# Patient Record
Sex: MALE | Race: WHITE | NOT HISPANIC OR LATINO | Employment: OTHER | URBAN - METROPOLITAN AREA
[De-identification: names, ages, dates, MRNs, and addresses within clinical notes are randomized per-mention and may not be internally consistent; named-entity substitution may affect disease eponyms.]

---

## 2024-03-20 ENCOUNTER — ANESTHESIA EVENT (OUTPATIENT)
Dept: GASTROENTEROLOGY | Age: 74
End: 2024-03-20

## 2024-03-20 ENCOUNTER — ANESTHESIA (OUTPATIENT)
Dept: GASTROENTEROLOGY | Age: 74
End: 2024-03-20

## 2024-03-20 ENCOUNTER — HOSPITAL ENCOUNTER (OUTPATIENT)
Dept: GASTROENTEROLOGY | Age: 74
Discharge: HOME | End: 2024-03-20
Attending: INTERNAL MEDICINE

## 2024-03-20 VITALS
OXYGEN SATURATION: 98 % | HEART RATE: 63 BPM | RESPIRATION RATE: 12 BRPM | SYSTOLIC BLOOD PRESSURE: 143 MMHG | TEMPERATURE: 98 F | DIASTOLIC BLOOD PRESSURE: 85 MMHG

## 2024-03-20 DIAGNOSIS — K21.9 GASTROESOPHAGEAL REFLUX DISEASE WITHOUT ESOPHAGITIS: ICD-10-CM

## 2024-03-20 DIAGNOSIS — K29.30 CHRONIC SUPERFICIAL GASTRITIS WITHOUT BLEEDING: ICD-10-CM

## 2024-03-20 DIAGNOSIS — R13.19 OTHER DYSPHAGIA: ICD-10-CM

## 2024-03-20 PROCEDURE — 10002801 HB RX 250 W/O HCPCS: Performed by: ANESTHESIOLOGY

## 2024-03-20 PROCEDURE — 13000008 HB ANESTHESIA MAC OUTSIDE OR

## 2024-03-20 PROCEDURE — 13000024 HB GI COMPLEX CASE S/U + 1ST 15 MIN

## 2024-03-20 PROCEDURE — 13000001 HB PHASE II RECOVERY EA 30 MINUTES

## 2024-03-20 PROCEDURE — 10002800 HB RX 250 W HCPCS: Performed by: ANESTHESIOLOGY

## 2024-03-20 RX ORDER — PROPOFOL 10 MG/ML
INJECTION, EMULSION INTRAVENOUS PRN
Status: DISCONTINUED | OUTPATIENT
Start: 2024-03-20 | End: 2024-03-20

## 2024-03-20 RX ORDER — LIDOCAINE HYDROCHLORIDE 10 MG/ML
INJECTION, SOLUTION INFILTRATION; PERINEURAL PRN
Status: DISCONTINUED | OUTPATIENT
Start: 2024-03-20 | End: 2024-03-20

## 2024-03-20 RX ADMIN — PROPOFOL INJECTABLE EMULSION 130 MCG/KG/MIN: 10 INJECTION, EMULSION INTRAVENOUS at 13:25

## 2024-03-20 RX ADMIN — LIDOCAINE HYDROCHLORIDE 5 ML: 10 INJECTION, SOLUTION INFILTRATION; PERINEURAL at 13:25

## 2024-03-20 RX ADMIN — PROPOFOL 50 MG: 10 INJECTION, EMULSION INTRAVENOUS at 13:25

## 2024-03-20 ASSESSMENT — PAIN SCALES - GENERAL: PAINLEVEL_OUTOF10: 0

## 2024-03-27 LAB
ASR DISCLAIMER: NORMAL
CASE RPRT: NORMAL
CLINICAL INFO: NORMAL
PATH REPORT.FINAL DX SPEC: NORMAL
PATH REPORT.GROSS SPEC: NORMAL

## 2025-02-10 ENCOUNTER — OFFICE VISIT (OUTPATIENT)
Dept: INTERNAL MEDICINE CLINIC | Facility: CLINIC | Age: 75
End: 2025-02-10
Payer: COMMERCIAL

## 2025-02-10 VITALS
DIASTOLIC BLOOD PRESSURE: 70 MMHG | WEIGHT: 199 LBS | HEART RATE: 66 BPM | OXYGEN SATURATION: 99 % | TEMPERATURE: 98 F | HEIGHT: 71 IN | RESPIRATION RATE: 18 BRPM | BODY MASS INDEX: 27.86 KG/M2 | SYSTOLIC BLOOD PRESSURE: 120 MMHG

## 2025-02-10 DIAGNOSIS — I10 PRIMARY HYPERTENSION: Primary | ICD-10-CM

## 2025-02-10 DIAGNOSIS — R73.03 PREDIABETES: ICD-10-CM

## 2025-02-10 DIAGNOSIS — R13.10 DYSPHAGIA, UNSPECIFIED TYPE: ICD-10-CM

## 2025-02-10 DIAGNOSIS — R79.89 LOW TSH LEVEL: ICD-10-CM

## 2025-02-10 DIAGNOSIS — R01.1 MURMUR, CARDIAC: ICD-10-CM

## 2025-02-10 DIAGNOSIS — Z86.0100 PERSONAL HISTORY OF COLON POLYPS, UNSPECIFIED: ICD-10-CM

## 2025-02-10 DIAGNOSIS — K21.9 GASTROESOPHAGEAL REFLUX DISEASE, UNSPECIFIED WHETHER ESOPHAGITIS PRESENT: ICD-10-CM

## 2025-02-10 DIAGNOSIS — J44.9 CHRONIC OBSTRUCTIVE PULMONARY DISEASE, UNSPECIFIED COPD TYPE (HCC): ICD-10-CM

## 2025-02-10 PROCEDURE — G2211 COMPLEX E/M VISIT ADD ON: HCPCS | Performed by: STUDENT IN AN ORGANIZED HEALTH CARE EDUCATION/TRAINING PROGRAM

## 2025-02-10 PROCEDURE — 3008F BODY MASS INDEX DOCD: CPT | Performed by: STUDENT IN AN ORGANIZED HEALTH CARE EDUCATION/TRAINING PROGRAM

## 2025-02-10 PROCEDURE — 3078F DIAST BP <80 MM HG: CPT | Performed by: STUDENT IN AN ORGANIZED HEALTH CARE EDUCATION/TRAINING PROGRAM

## 2025-02-10 PROCEDURE — 3074F SYST BP LT 130 MM HG: CPT | Performed by: STUDENT IN AN ORGANIZED HEALTH CARE EDUCATION/TRAINING PROGRAM

## 2025-02-10 PROCEDURE — 99204 OFFICE O/P NEW MOD 45 MIN: CPT | Performed by: STUDENT IN AN ORGANIZED HEALTH CARE EDUCATION/TRAINING PROGRAM

## 2025-02-10 RX ORDER — OMEPRAZOLE 40 MG/1
40 CAPSULE, DELAYED RELEASE ORAL
Qty: 90 CAPSULE | Refills: 0 | Status: SHIPPED | OUTPATIENT
Start: 2025-02-10

## 2025-02-10 RX ORDER — METOPROLOL SUCCINATE 25 MG/1
25 TABLET, EXTENDED RELEASE ORAL DAILY
COMMUNITY

## 2025-02-10 RX ORDER — TADALAFIL 5 MG/1
5 TABLET ORAL DAILY
COMMUNITY
Start: 2024-03-14

## 2025-02-10 NOTE — PROGRESS NOTES
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    # GERD  Patient complaining of epigastric abdominal discomfort today.  Was seeing GI in 03/2024. Would like to see another GI physician to establish care. Was taking Metoprolol prescribed apparently by GI for his stomach discomfort (?)    Prior visit to GI 11/2023  E/C 03/2023 with GERD LA C, HH, gastritis (no HPylori), 2 <1 cm colon polyps, diverticulosis, hemorrhoids    Patient was supposed to take Omeprazole    # Subclinical hyperthyroidism   He is originally from [facility]. 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 He does not recall if he got any treatment at that time. Per pt he had Thyroid u/s done in 2021 in [facility]/ some nodules were noted but the biopsy was not offered.     Thyroid u/s 5/3/23 Unremarkable thyroid ultrasound exam.   Thyroid scan:5/20/23 There is homogeneous uptake throughout the thyroid gland without evidence of a hot or cold nodule. The calculated uptake values are as follows:   Uptake at 6 hours: 7.2% (normal is 5-15%).   Uptake at 24 hours: 15.3% (normal is 10-30%).     # Prediabetes  Will need repeat labs    Wt Readings from Last 6 Encounters:   02/10/25 199 lb (90.3 kg)     Body mass index is 27.75 kg/m².       Current Outpatient Medications   Medication Sig Dispense Refill    tadalafil 5 MG Oral Tab Take 1 tablet (5 mg total) by mouth daily.      metoprolol succinate ER 25 MG Oral Tablet 24 Hr Take 1 tablet (25 mg total) by  mouth daily.      Omeprazole 40 MG Oral Capsule Delayed Release Take 1 capsule (40 mg total) by mouth before breakfast. 90 capsule 0      Past Medical History:    Essential hypertension      History reviewed. No pertinent surgical history.   Family History   Problem Relation Age of Onset    Cancer Mother       Social History:   Social History     Socioeconomic History    Marital status:    Tobacco Use    Smoking status: Former     Types: Cigarettes    Smokeless tobacco: Former   Vaping Use    Vaping status: Never Used   Substance and Sexual Activity    Alcohol use: Not Currently    Drug use: Never     Social Drivers of Health     Food Insecurity: High Risk (1/9/2023)    Received from [facility]    Food Insecurity     Have there been times that your food ran out, and you didn't have money to get more?: Yes     Are there times that you worry that this might happen?: No   Transportation Needs: Low Risk  (1/9/2023)    Received from [facility]    Transportation Needs     Do you have trouble getting transportation to medical appointments?: No   Housing Stability:   Recent Concern: Housing Stability - High Risk (1/9/2023)    Received from [facility]    Housing Stability     Are you concerned about having a safe and reliable place to live?: Yes          REVIEW OF SYSTEMS:   Review of Systems   Constitutional:  Negative for chills and fever.   HENT:  Negative for congestion, ear pain, hearing loss and sinus pain.    Eyes:  Negative for blurred vision, double vision and pain.   Respiratory:  Negative for cough, shortness of breath and wheezing.    Cardiovascular:  Negative for chest pain, palpitations and leg swelling.   Gastrointestinal:  Positive for abdominal pain (periodic epigastric), heartburn and nausea. Negative for constipation, diarrhea and vomiting.   Genitourinary:  Positive for frequency. Negative for urgency.   Musculoskeletal:  Negative  for back pain and joint pain.   Skin:  Negative for rash.   Neurological:  Negative for dizziness and headaches.   Psychiatric/Behavioral: Negative.        EXAM:   /70 (BP Location: Right arm, Patient Position: Sitting, Cuff Size: adult)   Pulse 66   Temp 97.6 °F (36.4 °C)   Resp 18   Ht 5' 11\" (1.803 m)   Wt 199 lb (90.3 kg)   SpO2 99%   BMI 27.75 kg/m²   Body mass index is 27.75 kg/m².   Physical Exam  Vitals reviewed.   Constitutional:       General: He is not in acute distress.     Appearance: Normal appearance. He is not ill-appearing or toxic-appearing.   HENT:      Head: Normocephalic and atraumatic.      Right Ear: Tympanic membrane, ear canal and external ear normal.      Left Ear: Tympanic membrane, ear canal and external ear normal.      Mouth/Throat:      Mouth: Mucous membranes are moist.      Pharynx: Oropharynx is clear. No oropharyngeal exudate or posterior oropharyngeal erythema.   Eyes:      Extraocular Movements: Extraocular movements intact.      Conjunctiva/sclera: Conjunctivae normal.      Pupils: Pupils are equal, round, and reactive to light.   Cardiovascular:      Rate and Rhythm: Normal rate and regular rhythm.      Pulses: Normal pulses.      Heart sounds: Murmur (systolic) heard.      No friction rub. No gallop.   Pulmonary:      Effort: Pulmonary effort is normal. No respiratory distress.      Breath sounds: Normal breath sounds. No stridor. No wheezing, rhonchi or rales.   Abdominal:      General: Abdomen is flat. There is no distension.      Palpations: There is no mass.      Tenderness: There is no abdominal tenderness. There is no right CVA tenderness, left CVA tenderness, guarding or rebound.      Hernia: No hernia is present.   Musculoskeletal:      Right lower leg: No edema.      Left lower leg: No edema.   Skin:     General: Skin is warm.      Capillary Refill: Capillary refill takes less than 2 seconds.      Coloration: Skin is not jaundiced or pale.      Findings:  No bruising, erythema, lesion or rash.   Neurological:      General: No focal deficit present.      Mental Status: He is alert and oriented to person, place, and time.      Cranial Nerves: No cranial nerve deficit.      Sensory: No sensory deficit.      Motor: No weakness.   Psychiatric:         Mood and Affect: Mood normal.         Behavior: Behavior normal.         Thought Content: Thought content normal.         Judgment: Judgment normal.            ASSESSMENT AND PLAN:   Vitalii Gerasymenko is a 74 year old male who presents to [facility]    1. Primary hypertension  Good control with metoprolol. Will continue  - CARD ECHO 2D DOPPLER (CPT=93306); Future  - Lipid Panel [E]; Future  - Hemoglobin A1C [E]; Future    2. Murmur, cardiac  - CARD ECHO 2D DOPPLER (CPT=93306); Future    3. Personal history of colon polyps, unspecified  - Gastro Referral - In Network    4. Dysphagia, unspecified type  - Gastro Referral - In Network  - CBC W Differential W Platelet [E]; Future  - Comp Metabolic Panel (14) [E]; Future  - TSH [E]; Future  - Omeprazole 40 MG Oral Capsule Delayed Release; Take 1 capsule (40 mg total) by mouth before breakfast.  Dispense: 90 capsule; Refill: 0    5. Gastroesophageal reflux disease, unspecified whether esophagitis present  - Gastro Referral - In Network  - CBC W Differential W Platelet [E]; Future  - Comp Metabolic Panel (14) [E]; Future  - TSH [E]; Future  - Lipid Panel [E]; Future  - Hemoglobin A1C [E]; Future  - Omeprazole 40 MG Oral Capsule Delayed Release; Take 1 capsule (40 mg total) by mouth before breakfast.  Dispense: 90 capsule; Refill: 0    6. Prediabetes  - CBC W Differential W Platelet [E]; Future  - Comp Metabolic Panel (14) [E]; Future  - TSH [E]; Future    7. Low TSH level  - TSH [E]; Future  - Free T4 (Free Thyroxine); Future    8. Chronic obstructive pulmonary disease, unspecified COPD type (HCC)  - Pulmonary Referral - In Network       Return in about 4 weeks (around  3/10/2025) for physical exam and lab follow up.      Clare Leal MD

## 2025-02-11 ENCOUNTER — LAB ENCOUNTER (OUTPATIENT)
Dept: LAB | Age: 75
End: 2025-02-11
Attending: STUDENT IN AN ORGANIZED HEALTH CARE EDUCATION/TRAINING PROGRAM
Payer: COMMERCIAL

## 2025-02-11 DIAGNOSIS — K21.9 GASTROESOPHAGEAL REFLUX DISEASE, UNSPECIFIED WHETHER ESOPHAGITIS PRESENT: ICD-10-CM

## 2025-02-11 DIAGNOSIS — R79.89 LOW TSH LEVEL: ICD-10-CM

## 2025-02-11 DIAGNOSIS — I10 PRIMARY HYPERTENSION: ICD-10-CM

## 2025-02-11 DIAGNOSIS — R13.10 DYSPHAGIA, UNSPECIFIED TYPE: ICD-10-CM

## 2025-02-11 DIAGNOSIS — R73.03 PREDIABETES: ICD-10-CM

## 2025-02-11 LAB
ALBUMIN SERPL-MCNC: 4.5 G/DL (ref 3.2–4.8)
ALBUMIN/GLOB SERPL: 1.5 {RATIO} (ref 1–2)
ALP LIVER SERPL-CCNC: 53 U/L
ALT SERPL-CCNC: 29 U/L
ANION GAP SERPL CALC-SCNC: 11 MMOL/L (ref 0–18)
AST SERPL-CCNC: 26 U/L (ref ?–34)
BASOPHILS # BLD AUTO: 0.03 X10(3) UL (ref 0–0.2)
BASOPHILS NFR BLD AUTO: 0.6 %
BILIRUB SERPL-MCNC: 1.7 MG/DL (ref 0.2–1.1)
BUN BLD-MCNC: 19 MG/DL (ref 9–23)
CALCIUM BLD-MCNC: 9.8 MG/DL (ref 8.7–10.6)
CHLORIDE SERPL-SCNC: 104 MMOL/L (ref 98–112)
CHOLEST SERPL-MCNC: 234 MG/DL (ref ?–200)
CO2 SERPL-SCNC: 25 MMOL/L (ref 21–32)
CREAT BLD-MCNC: 1.33 MG/DL
EGFRCR SERPLBLD CKD-EPI 2021: 56 ML/MIN/1.73M2 (ref 60–?)
EOSINOPHIL # BLD AUTO: 0.13 X10(3) UL (ref 0–0.7)
EOSINOPHIL NFR BLD AUTO: 2.6 %
ERYTHROCYTE [DISTWIDTH] IN BLOOD BY AUTOMATED COUNT: 13.3 %
EST. AVERAGE GLUCOSE BLD GHB EST-MCNC: 128 MG/DL (ref 68–126)
FASTING PATIENT LIPID ANSWER: YES
FASTING STATUS PATIENT QL REPORTED: YES
GLOBULIN PLAS-MCNC: 3.1 G/DL (ref 2–3.5)
GLUCOSE BLD-MCNC: 124 MG/DL (ref 70–99)
HBA1C MFR BLD: 6.1 % (ref ?–5.7)
HCT VFR BLD AUTO: 43.1 %
HDLC SERPL-MCNC: 56 MG/DL (ref 40–59)
HGB BLD-MCNC: 14.8 G/DL
IMM GRANULOCYTES # BLD AUTO: 0.02 X10(3) UL (ref 0–1)
IMM GRANULOCYTES NFR BLD: 0.4 %
LDLC SERPL CALC-MCNC: 155 MG/DL (ref ?–100)
LYMPHOCYTES # BLD AUTO: 1.28 X10(3) UL (ref 1–4)
LYMPHOCYTES NFR BLD AUTO: 26 %
MCH RBC QN AUTO: 29.2 PG (ref 26–34)
MCHC RBC AUTO-ENTMCNC: 34.3 G/DL (ref 31–37)
MCV RBC AUTO: 85 FL
MONOCYTES # BLD AUTO: 0.49 X10(3) UL (ref 0.1–1)
MONOCYTES NFR BLD AUTO: 10 %
NEUTROPHILS # BLD AUTO: 2.97 X10 (3) UL (ref 1.5–7.7)
NEUTROPHILS # BLD AUTO: 2.97 X10(3) UL (ref 1.5–7.7)
NEUTROPHILS NFR BLD AUTO: 60.4 %
NONHDLC SERPL-MCNC: 178 MG/DL (ref ?–130)
OSMOLALITY SERPL CALC.SUM OF ELEC: 294 MOSM/KG (ref 275–295)
PLATELET # BLD AUTO: 218 10(3)UL (ref 150–450)
POTASSIUM SERPL-SCNC: 4.3 MMOL/L (ref 3.5–5.1)
PROT SERPL-MCNC: 7.6 G/DL (ref 5.7–8.2)
RBC # BLD AUTO: 5.07 X10(6)UL
SODIUM SERPL-SCNC: 140 MMOL/L (ref 136–145)
T4 FREE SERPL-MCNC: 1.2 NG/DL (ref 0.8–1.7)
TRIGL SERPL-MCNC: 127 MG/DL (ref 30–149)
TSI SER-ACNC: 0.41 UIU/ML (ref 0.55–4.78)
VLDLC SERPL CALC-MCNC: 24 MG/DL (ref 0–30)
WBC # BLD AUTO: 4.9 X10(3) UL (ref 4–11)

## 2025-02-11 PROCEDURE — 83036 HEMOGLOBIN GLYCOSYLATED A1C: CPT | Performed by: STUDENT IN AN ORGANIZED HEALTH CARE EDUCATION/TRAINING PROGRAM

## 2025-02-11 PROCEDURE — 80050 GENERAL HEALTH PANEL: CPT | Performed by: STUDENT IN AN ORGANIZED HEALTH CARE EDUCATION/TRAINING PROGRAM

## 2025-02-11 PROCEDURE — 80061 LIPID PANEL: CPT | Performed by: STUDENT IN AN ORGANIZED HEALTH CARE EDUCATION/TRAINING PROGRAM

## 2025-02-11 PROCEDURE — 84439 ASSAY OF FREE THYROXINE: CPT | Performed by: STUDENT IN AN ORGANIZED HEALTH CARE EDUCATION/TRAINING PROGRAM

## 2025-02-14 PROBLEM — Z86.0100 PERSONAL HISTORY OF COLON POLYPS, UNSPECIFIED: Status: ACTIVE | Noted: 2025-02-14

## 2025-02-14 PROBLEM — J44.9 CHRONIC OBSTRUCTIVE PULMONARY DISEASE (HCC): Status: ACTIVE | Noted: 2025-02-14

## 2025-02-14 PROBLEM — R73.03 PREDIABETES: Status: ACTIVE | Noted: 2025-02-14

## 2025-02-14 PROBLEM — K21.9 GASTROESOPHAGEAL REFLUX DISEASE: Status: ACTIVE | Noted: 2025-02-14

## 2025-02-14 PROBLEM — I10 PRIMARY HYPERTENSION: Status: ACTIVE | Noted: 2025-02-14

## 2025-02-14 PROBLEM — R13.10 DYSPHAGIA: Status: ACTIVE | Noted: 2025-02-14

## 2025-03-03 ENCOUNTER — HOSPITAL ENCOUNTER (OUTPATIENT)
Dept: CV DIAGNOSTICS | Facility: HOSPITAL | Age: 75
Discharge: HOME | End: 2025-03-03
Attending: STUDENT IN AN ORGANIZED HEALTH CARE EDUCATION/TRAINING PROGRAM
Payer: COMMERCIAL

## 2025-03-03 DIAGNOSIS — I10 PRIMARY HYPERTENSION: ICD-10-CM

## 2025-03-03 DIAGNOSIS — R01.1 MURMUR, CARDIAC: ICD-10-CM

## 2025-03-03 PROCEDURE — 93306 TTE W/DOPPLER COMPLETE: CPT | Performed by: STUDENT IN AN ORGANIZED HEALTH CARE EDUCATION/TRAINING PROGRAM

## 2025-03-05 ENCOUNTER — OFFICE VISIT (OUTPATIENT)
Dept: INTERNAL MEDICINE CLINIC | Facility: CLINIC | Age: 75
End: 2025-03-05
Payer: COMMERCIAL

## 2025-03-05 ENCOUNTER — LAB ENCOUNTER (OUTPATIENT)
Dept: LAB | Age: 75
End: 2025-03-05
Attending: STUDENT IN AN ORGANIZED HEALTH CARE EDUCATION/TRAINING PROGRAM
Payer: COMMERCIAL

## 2025-03-05 VITALS
TEMPERATURE: 98 F | RESPIRATION RATE: 18 BRPM | HEIGHT: 71 IN | SYSTOLIC BLOOD PRESSURE: 126 MMHG | BODY MASS INDEX: 28.7 KG/M2 | DIASTOLIC BLOOD PRESSURE: 66 MMHG | OXYGEN SATURATION: 98 % | WEIGHT: 205 LBS | HEART RATE: 67 BPM

## 2025-03-05 DIAGNOSIS — R79.89 ELEVATED SERUM CREATININE: ICD-10-CM

## 2025-03-05 DIAGNOSIS — G47.33 OSA (OBSTRUCTIVE SLEEP APNEA): ICD-10-CM

## 2025-03-05 DIAGNOSIS — E78.2 MIXED HYPERLIPIDEMIA: ICD-10-CM

## 2025-03-05 DIAGNOSIS — Z12.5 PROSTATE CANCER SCREENING: ICD-10-CM

## 2025-03-05 DIAGNOSIS — Z00.00 PHYSICAL EXAM, ANNUAL: Primary | ICD-10-CM

## 2025-03-05 DIAGNOSIS — E05.90 SUBCLINICAL HYPERTHYROIDISM: ICD-10-CM

## 2025-03-05 DIAGNOSIS — R73.03 PREDIABETES: ICD-10-CM

## 2025-03-05 DIAGNOSIS — I36.1 NONRHEUMATIC TRICUSPID VALVE REGURGITATION: ICD-10-CM

## 2025-03-05 DIAGNOSIS — I34.0 MITRAL VALVE INSUFFICIENCY, UNSPECIFIED ETIOLOGY: ICD-10-CM

## 2025-03-05 DIAGNOSIS — I27.20 PULMONARY HTN (HCC): ICD-10-CM

## 2025-03-05 DIAGNOSIS — R06.83 SNORING: ICD-10-CM

## 2025-03-05 DIAGNOSIS — I37.1 PULMONARY VALVE INSUFFICIENCY, UNSPECIFIED ETIOLOGY: ICD-10-CM

## 2025-03-05 LAB
ALBUMIN SERPL-MCNC: 4.5 G/DL (ref 3.2–4.8)
ALBUMIN/GLOB SERPL: 1.5 {RATIO} (ref 1–2)
ALP LIVER SERPL-CCNC: 51 U/L
ALT SERPL-CCNC: 27 U/L
ANION GAP SERPL CALC-SCNC: 8 MMOL/L (ref 0–18)
AST SERPL-CCNC: 27 U/L (ref ?–34)
BILIRUB SERPL-MCNC: 1.4 MG/DL (ref 0.2–1.1)
BUN BLD-MCNC: 17 MG/DL (ref 9–23)
CALCIUM BLD-MCNC: 9.2 MG/DL (ref 8.7–10.6)
CHLORIDE SERPL-SCNC: 105 MMOL/L (ref 98–112)
CO2 SERPL-SCNC: 28 MMOL/L (ref 21–32)
COMPLEXED PSA SERPL-MCNC: 3.08 NG/ML (ref ?–4)
CREAT BLD-MCNC: 1.47 MG/DL
EGFRCR SERPLBLD CKD-EPI 2021: 50 ML/MIN/1.73M2 (ref 60–?)
FASTING STATUS PATIENT QL REPORTED: NO
GLOBULIN PLAS-MCNC: 3 G/DL (ref 2–3.5)
GLUCOSE BLD-MCNC: 94 MG/DL (ref 70–99)
OSMOLALITY SERPL CALC.SUM OF ELEC: 293 MOSM/KG (ref 275–295)
POTASSIUM SERPL-SCNC: 4.1 MMOL/L (ref 3.5–5.1)
PROT SERPL-MCNC: 7.5 G/DL (ref 5.7–8.2)
SODIUM SERPL-SCNC: 141 MMOL/L (ref 136–145)

## 2025-03-05 PROCEDURE — 3078F DIAST BP <80 MM HG: CPT | Performed by: STUDENT IN AN ORGANIZED HEALTH CARE EDUCATION/TRAINING PROGRAM

## 2025-03-05 PROCEDURE — 3074F SYST BP LT 130 MM HG: CPT | Performed by: STUDENT IN AN ORGANIZED HEALTH CARE EDUCATION/TRAINING PROGRAM

## 2025-03-05 PROCEDURE — 99213 OFFICE O/P EST LOW 20 MIN: CPT | Performed by: STUDENT IN AN ORGANIZED HEALTH CARE EDUCATION/TRAINING PROGRAM

## 2025-03-05 PROCEDURE — G0103 PSA SCREENING: HCPCS | Performed by: STUDENT IN AN ORGANIZED HEALTH CARE EDUCATION/TRAINING PROGRAM

## 2025-03-05 PROCEDURE — 80053 COMPREHEN METABOLIC PANEL: CPT | Performed by: STUDENT IN AN ORGANIZED HEALTH CARE EDUCATION/TRAINING PROGRAM

## 2025-03-05 PROCEDURE — 99397 PER PM REEVAL EST PAT 65+ YR: CPT | Performed by: STUDENT IN AN ORGANIZED HEALTH CARE EDUCATION/TRAINING PROGRAM

## 2025-03-05 PROCEDURE — 3008F BODY MASS INDEX DOCD: CPT | Performed by: STUDENT IN AN ORGANIZED HEALTH CARE EDUCATION/TRAINING PROGRAM

## 2025-03-05 RX ORDER — ATORVASTATIN CALCIUM 20 MG/1
20 TABLET, FILM COATED ORAL NIGHTLY
Qty: 90 TABLET | Refills: 1 | Status: SHIPPED | OUTPATIENT
Start: 2025-03-05

## 2025-03-05 RX ORDER — MEMANTINE HYDROCHLORIDE 10 MG/1
10 TABLET ORAL NIGHTLY
COMMUNITY

## 2025-03-05 NOTE — PROGRESS NOTES
[facility]    CHIEF COMPLAINT:   Chief Complaint   Patient presents with    Routine Physical           The following individual(s) verbally consented to be recorded using ambient AI listening technology and understand that they can each withdraw their consent to this listening technology at any point by asking the clinician to turn off or pause the recording:    Patient name: Vitalii Gerasymenko  Additional names:  Gerasymenko,Valentyna    HPI:   Vitalii Gerasymenko is a 75 year old male who presents for a complete physical exam.      Patient Active Problem List   Diagnosis    Primary hypertension    Personal history of colon polyps, unspecified    Dysphagia    Gastroesophageal reflux disease    Prediabetes    Chronic obstructive pulmonary disease (HCC)    Pulmonary HTN (HCC)    Mitral valve insufficiency    Pulmonary valve insufficiency    POLA (obstructive sleep apnea)    Mixed hyperlipidemia    Nonrheumatic tricuspid valve regurgitation    Subclinical hyperthyroidism      History of Present Illness  He is accompanied by Delmis, his daughter.    # GERD  Was seeing GI in 03/2024. Was taking Metoprolol prescribed apparently by GI for his stomach discomfort (?)  Per record: E/C 03/2023 with GERD LA C, HH, gastritis (no HPylori), 2 <1 cm colon polyps, diverticulosis, hemorrhoids  3/2025: Taking Omeprazole 40 mg daily, planning repeat EGD/Colonoscopy in April     # Subclinical hyperthyroidism   From [employer] He was exposed to [place]TI[place] in [place]/ worked in that area. He does not recall if he got any treatment at that time. Per pt he had Thyroid u/s done in 2021 in [facility]/ some nodules were noted but the biopsy was not offered.   Thyroid US 5/3/23 Unremarkable thyroid ultrasound exam.   Thyroid scan:5/20/23 There is homogeneous uptake throughout the thyroid gland without evidence of a hot or cold nodule. The calculated uptake values were WNL  2/2025: TSH 0.407, T4 1.2. Asymptomatic     # Prediabetes  2/2025:  HbA1c -6.1     # HLD  2/2025: , HDL 56, ,      # CKD  He mentions a history of elevated creatinine levels but has no known diagnosis of chronic kidney disease. He recalls elevated creatinine levels in 2024, attributing it to medication use, No known kidney problems are reported.  Creatinine : 1.33 (2/11/2025) -> 1.47 (3/5/2025)  Unclear etiology - will need further workup    # Nonrheumatic mitral insufficiency   # Primary pulmonary HTN   # Pulmonary valve insufficiency   # Tricuspid valve insufficiency  ECHO 3/3/2025: EF of 65-70%, mild aortic sclerosis, mild mitral valve calcification, and mild - moderate tricuspid regurgitation. Pulm valve: Mild to moderate regurgitation. PA ressure elevated at 40 mmHg  Will need to see Cardiology  Will need Sleep study    #POLA  Has h/o sleep apnea with symptoms of snoring, daytime fatigue, and occasional nocturnal awakenings. He has not had a recent sleep study.  He has not yet followed up with a pulmonologist as previously planned.    He has a history of prostate issues and takes tadalafil occasionally for urinary symptoms. He has not had a recent follow-up with a urologist.      Diet: Could be better  Exercise:  Not exercsing regularly  Driving: yes, with the seatbelt  Smoking: Former  Alcohol: Sometimes  No other substance use.     Vaccinations:  Influenza: Does not want at this time   Pneumococcal: Due, Will provide the info  Shingles: Due  Tdap/Td: Due, will provide the info    Screenings:  Colonoscopy:  3/3/2023: 2 polyps in transversecolon, external and internal hemorrhoids, moderate diverticulosis in sigmoid and descending colon.  Repeat colonoscopy recommended 5 years  PSA: 3.08 (3/5/2025)   AAA ultrasound screen? (age 65-75 with any smoking history): Will need, will order next visit  Aspirin use? (age 50-59 with ASCVD risk 10% or greater): might need to consider.       Wt Readings from Last 6 Encounters:   03/28/25 207 lb (93.9 kg)   03/05/25 205 lb  (93 kg)   02/10/25 199 lb (90.3 kg)     Body mass index is 28.59 kg/m².       Current Outpatient Medications   Medication Sig Dispense Refill    memantine 10 MG Oral Tab Take 1 tablet (10 mg total) by mouth at bedtime.      atorvastatin 20 MG Oral Tab Take 1 tablet (20 mg total) by mouth nightly. 90 tablet 1    tadalafil 5 MG Oral Tab Take 1 tablet (5 mg total) by mouth daily.      Omeprazole 40 MG Oral Capsule Delayed Release Take 1 capsule (40 mg total) by mouth before breakfast. 90 capsule 0    cyclobenzaprine 5 MG Oral Tab Take 1-2 tablets (5-10 mg total) by mouth nightly. 20 tablet 0    predniSONE 20 MG Oral Tab Take 2 tablets (40 mg total) by mouth daily for 3 days, THEN 1.5 tablets (30 mg total) daily for 2 days, THEN 1 tablet (20 mg total) daily for 2 days, THEN 0.5 tablets (10 mg total) daily for 2 days. 12 tablet 0    metoprolol succinate ER 25 MG Oral Tablet 24 Hr Take 1 tablet (25 mg total) by mouth daily. (Patient not taking: Reported on 3/5/2025)        Past Medical History:    Essential hypertension      History reviewed. No pertinent surgical history.   Family History   Problem Relation Age of Onset    Cancer Mother     Diabetes Daughter       Social History:   Social History     Socioeconomic History    Marital status:    Tobacco Use    Smoking status: Former     Types: Cigarettes    Smokeless tobacco: Former   Vaping Use    Vaping status: Never Used   Substance and Sexual Activity    Alcohol use: Not Currently    Drug use: Never   Other Topics Concern    Caffeine Concern No    Exercise No    Seat Belt Yes    Special Diet No    Stress Concern No    Weight Concern No     Social Drivers of Health     Food Insecurity: High Risk (1/9/2023)    Received from [facility]    Food Insecurity     Have there been times that your food ran out, and you didn't have money to get more?: Yes     Are there times that you worry that this might happen?: No   Transportation Needs: Low Risk   (1/9/2023)    Received from [facility]    Transportation Needs     Do you have trouble getting transportation to medical appointments?: No   Housing Stability:   Recent Concern: Housing Stability - High Risk (1/9/2023)    Received from [facility]    Housing Stability     Are you concerned about having a safe and reliable place to live?: Yes          REVIEW OF SYSTEMS:   Review of Systems   Constitutional:  Negative for chills and fever.   HENT:  Negative for congestion, ear pain, hearing loss and sinus pain.    Eyes:  Negative for blurred vision, double vision and pain.   Respiratory:  Negative for cough, shortness of breath and wheezing.    Cardiovascular:  Negative for chest pain, palpitations and leg swelling.   Gastrointestinal:  Positive for abdominal pain (periodic epigastric), heartburn and nausea. Negative for constipation, diarrhea and vomiting.   Genitourinary:  Positive for frequency. Negative for urgency.   Musculoskeletal:  Negative for back pain and joint pain.   Skin:  Negative for rash.   Neurological:  Negative for dizziness and headaches.   Psychiatric/Behavioral: Negative.        EXAM:   /66 (BP Location: Left arm, Patient Position: Sitting, Cuff Size: adult)   Pulse 67   Temp 97.6 °F (36.4 °C)   Resp 18   Ht 5' 11\" (1.803 m)   Wt 205 lb (93 kg)   SpO2 98%   PF 99 L/min   BMI 28.59 kg/m²   Body mass index is 28.59 kg/m².   Physical Exam  Vitals reviewed.   Constitutional:       General: He is not in acute distress.     Appearance: Normal appearance. He is not ill-appearing or toxic-appearing.   HENT:      Head: Normocephalic and atraumatic.      Right Ear: Tympanic membrane, ear canal and external ear normal.      Left Ear: Tympanic membrane, ear canal and external ear normal.      Mouth/Throat:      Mouth: Mucous membranes are moist.      Pharynx: Oropharynx is clear. No oropharyngeal exudate or posterior oropharyngeal erythema.   Eyes:       Extraocular Movements: Extraocular movements intact.      Conjunctiva/sclera: Conjunctivae normal.      Pupils: Pupils are equal, round, and reactive to light.   Cardiovascular:      Rate and Rhythm: Normal rate and regular rhythm.      Pulses: Normal pulses.      Heart sounds: Murmur (mild systolic) heard.      No friction rub. No gallop.   Pulmonary:      Effort: Pulmonary effort is normal. No respiratory distress.      Breath sounds: Normal breath sounds. No stridor. No wheezing, rhonchi or rales.   Abdominal:      General: Abdomen is flat. There is no distension.      Palpations: There is no mass.      Tenderness: There is no abdominal tenderness. There is no right CVA tenderness, left CVA tenderness, guarding or rebound.      Hernia: No hernia is present.   Musculoskeletal:      Right lower leg: No edema.      Left lower leg: No edema.   Skin:     General: Skin is warm.      Capillary Refill: Capillary refill takes less than 2 seconds.      Coloration: Skin is not jaundiced or pale.      Findings: No bruising, erythema, lesion or rash.   Neurological:      General: No focal deficit present.      Mental Status: He is alert and oriented to person, place, and time.      Cranial Nerves: No cranial nerve deficit.      Sensory: No sensory deficit.      Motor: No weakness.   Psychiatric:         Mood and Affect: Mood normal.         Behavior: Behavior normal.         Thought Content: Thought content normal.         Judgment: Judgment normal.          Labs:   Lab Results   Component Value Date/Time    WBC 4.9 02/11/2025 09:41 AM    HGB 14.8 02/11/2025 09:41 AM    .0 02/11/2025 09:41 AM      Lab Results   Component Value Date/Time    GLU 94 03/05/2025 01:28 PM     03/05/2025 01:28 PM    K 4.1 03/05/2025 01:28 PM     03/05/2025 01:28 PM    CO2 28.0 03/05/2025 01:28 PM    CREATSERUM 1.47 (H) 03/05/2025 01:28 PM    CA 9.2 03/05/2025 01:28 PM    ALB 4.5 03/05/2025 01:28 PM    TP 7.5 03/05/2025 01:28 PM     ALKPHO 51 03/05/2025 01:28 PM    AST 27 03/05/2025 01:28 PM    ALT 27 03/05/2025 01:28 PM    BILT 1.4 (H) 03/05/2025 01:28 PM    TSH 0.407 (L) 02/11/2025 09:41 AM    T4F 1.2 02/11/2025 09:41 AM        Lab Results   Component Value Date/Time    CHOLEST 234 (H) 02/11/2025 09:41 AM    HDL 56 02/11/2025 09:41 AM    TRIG 127 02/11/2025 09:41 AM     (H) 02/11/2025 09:41 AM    NONHDLC 178 (H) 02/11/2025 09:41 AM       Lab Results   Component Value Date/Time    A1C 6.1 (H) 02/11/2025 09:41 AM      Vitamin D:    No results found for: \"VITD\"        ASSESSMENT AND PLAN:   Vitalii Gerasymenko is a 75 year old male who presents for a complete physical exam.     1. Physical exam, annual  Pt' s weight is Body mass index is 28.59 kg/m².. Recommended regular exercise.   Age appropriate screenings discussed and orders placed.  The patient indicates understanding of these issues and agrees to the plan.  Annual eye exam and Q 6 month dental exam recommended    2. Prediabetes  Hemoglobin A1c indicates prediabetes. Discussed importance of management to prevent progression to diabetes.  - Consider lifestyle modifications.  - atorvastatin 20 MG Oral Tab; Take 1 tablet (20 mg total) by mouth nightly.  Dispense: 90 tablet; Refill: 1    3. Mixed hyperlipidemia  Elevated LDL and total cholesterol with normal HDL. Atorvastatin initiation recommended due to prediabetes diagnosis.  - Prescribe atorvastatin 20 mg daily.  - Send prescription to [facility] pharmacy.  - atorvastatin 20 MG Oral Tab; Take 1 tablet (20 mg total) by mouth nightly.  Dispense: 90 tablet; Refill: 1    4. Pulmonary valve insufficiency, unspecified etiology  5. Mitral valve insufficiency, unspecified etiology  6. Pulmonary HTN (HCC)  7. Tricuspid valve in  - Refer to cardiologist for evaluation.  - Continue metoprolol 25 mg daily, confirm with cardiologist.  - Cardio Referral - Internal    7. POLA (obstructive sleep apnea)  Symptoms suggestive of obstructive sleep apnea.  Sleep study recommended to confirm diagnosis and assess severity.  - Order sleep study    8. Snoring  - Diagnostic Sleep Study-split night PAP implemented if criteria met  - General sleep study; Future    9. Elevated serum creatinine  Elevated creatinine levels with unclear etiology.   - Repeat creatinine test.  - Order renal ultrasound.  - Comp Metabolic Panel (14) [E]; Future  - Urine studies    10. Prostate cancer screening  - PSA, Total (Screening) [E]; Future     11. Subclinical hyperthyroidism  Low TSH levels. Under endocrinologist care with regular monitoring. No management changes needed.    Return in about 6 months (around 9/5/2025) for Medicaltion check.      Clare Leal MD

## 2025-03-06 DIAGNOSIS — R79.89 ELEVATED SERUM CREATININE: Primary | ICD-10-CM

## 2025-03-14 ENCOUNTER — TELEPHONE (OUTPATIENT)
Dept: INTERNAL MEDICINE CLINIC | Facility: CLINIC | Age: 75
End: 2025-03-14

## 2025-03-14 NOTE — TELEPHONE ENCOUNTER
Incoming (mail or fax):  mail  Received from:  [facility]  Documentation given to:  triage in     Referral approval for Dr Rojas

## 2025-03-20 ENCOUNTER — HOSPITAL ENCOUNTER (OUTPATIENT)
Dept: ULTRASOUND IMAGING | Facility: HOSPITAL | Age: 75
Discharge: HOME | End: 2025-03-20
Attending: STUDENT IN AN ORGANIZED HEALTH CARE EDUCATION/TRAINING PROGRAM
Payer: COMMERCIAL

## 2025-03-20 ENCOUNTER — ANCILLARY ORDERS (OUTPATIENT)
Dept: INTERNAL MEDICINE CLINIC | Facility: CLINIC | Age: 75
End: 2025-03-20

## 2025-03-20 DIAGNOSIS — R06.83 SNORING: ICD-10-CM

## 2025-03-20 DIAGNOSIS — G47.33 OSA (OBSTRUCTIVE SLEEP APNEA): ICD-10-CM

## 2025-03-20 DIAGNOSIS — I37.1 PULMONARY VALVE INSUFFICIENCY, UNSPECIFIED ETIOLOGY: ICD-10-CM

## 2025-03-20 DIAGNOSIS — R79.89 ELEVATED SERUM CREATININE: ICD-10-CM

## 2025-03-20 DIAGNOSIS — Z12.5 PROSTATE CANCER SCREENING: ICD-10-CM

## 2025-03-20 DIAGNOSIS — I27.20 PULMONARY HTN (HCC): ICD-10-CM

## 2025-03-20 DIAGNOSIS — N18.31 STAGE 3A CHRONIC KIDNEY DISEASE (HCC): ICD-10-CM

## 2025-03-20 DIAGNOSIS — I34.0 MITRAL VALVE INSUFFICIENCY, UNSPECIFIED ETIOLOGY: ICD-10-CM

## 2025-03-20 DIAGNOSIS — E78.2 MIXED HYPERLIPIDEMIA: Primary | ICD-10-CM

## 2025-03-20 DIAGNOSIS — R73.03 PREDIABETES: ICD-10-CM

## 2025-03-20 PROCEDURE — 76770 US EXAM ABDO BACK WALL COMP: CPT | Performed by: STUDENT IN AN ORGANIZED HEALTH CARE EDUCATION/TRAINING PROGRAM

## 2025-03-26 ENCOUNTER — LAB ENCOUNTER (OUTPATIENT)
Dept: LAB | Age: 75
End: 2025-03-26
Attending: STUDENT IN AN ORGANIZED HEALTH CARE EDUCATION/TRAINING PROGRAM
Payer: COMMERCIAL

## 2025-03-26 DIAGNOSIS — N18.31 STAGE 3A CHRONIC KIDNEY DISEASE (HCC): ICD-10-CM

## 2025-03-26 DIAGNOSIS — R79.89 ELEVATED SERUM CREATININE: ICD-10-CM

## 2025-03-26 DIAGNOSIS — R73.03 PREDIABETES: ICD-10-CM

## 2025-03-26 DIAGNOSIS — E78.2 MIXED HYPERLIPIDEMIA: ICD-10-CM

## 2025-03-26 LAB
BILIRUB UR QL STRIP.AUTO: NEGATIVE
CLARITY UR REFRACT.AUTO: CLEAR
CREAT UR-SCNC: 77.6 MG/DL
CREAT UR-SCNC: 77.6 MG/DL
GLUCOSE UR STRIP.AUTO-MCNC: NORMAL MG/DL
KETONES UR STRIP.AUTO-MCNC: NEGATIVE MG/DL
LEUKOCYTE ESTERASE UR QL STRIP.AUTO: NEGATIVE
MICROALBUMIN UR-MCNC: <0.3 MG/DL
NITRITE UR QL STRIP.AUTO: NEGATIVE
PH UR STRIP.AUTO: 5.5 [PH] (ref 5–8)
PROT UR STRIP.AUTO-MCNC: NEGATIVE MG/DL
PROT UR-MCNC: <6 MG/DL (ref ?–14)
RBC UR QL AUTO: NEGATIVE
SP GR UR STRIP.AUTO: 1.02 (ref 1–1.03)
UROBILINOGEN UR STRIP.AUTO-MCNC: NORMAL MG/DL

## 2025-03-26 PROCEDURE — 82570 ASSAY OF URINE CREATININE: CPT | Performed by: STUDENT IN AN ORGANIZED HEALTH CARE EDUCATION/TRAINING PROGRAM

## 2025-03-26 PROCEDURE — 81003 URINALYSIS AUTO W/O SCOPE: CPT | Performed by: STUDENT IN AN ORGANIZED HEALTH CARE EDUCATION/TRAINING PROGRAM

## 2025-03-26 PROCEDURE — 84156 ASSAY OF PROTEIN URINE: CPT | Performed by: STUDENT IN AN ORGANIZED HEALTH CARE EDUCATION/TRAINING PROGRAM

## 2025-03-26 PROCEDURE — 82043 UR ALBUMIN QUANTITATIVE: CPT | Performed by: STUDENT IN AN ORGANIZED HEALTH CARE EDUCATION/TRAINING PROGRAM

## 2025-03-27 ENCOUNTER — TELEPHONE (OUTPATIENT)
Dept: INTERNAL MEDICINE CLINIC | Facility: CLINIC | Age: 75
End: 2025-03-27

## 2025-03-27 NOTE — TELEPHONE ENCOUNTER
Patients wife called to say that her  is experiencing unbearable pain 8/10 in his lower back/ waist area.

## 2025-03-27 NOTE — TELEPHONE ENCOUNTER
Daughter calling Tisha-inflammation of nerve in back. On Right side.   [place] [facility] in [place].  Chronic-comes and goes, this time going on for 24 hours. No radiculopathy. Denies fevers, no bowel changes. Now not taking any pain medications. Pain is on scale 10/10 .  They are asking for pain medications. I told her oral Rx may take a while to take effect and ER would be a better choice. They want to speak with Dr. Sparks.

## 2025-03-27 NOTE — TELEPHONE ENCOUNTER
After speaking to , she offered to see tomorrow AM to evaluate this, in the meantime, take Ibuprofen 800 mg every 8 hours. If pain continues at 10/10 or worsens, head to ER for evaluation.   Daughter confirms they have Ibuprofen on hand, to take like above. They will plan to come in AM, appt scheduled. Daughter verbalized understanding.

## 2025-03-28 ENCOUNTER — OFFICE VISIT (OUTPATIENT)
Dept: INTERNAL MEDICINE CLINIC | Facility: CLINIC | Age: 75
End: 2025-03-28
Payer: COMMERCIAL

## 2025-03-28 VITALS
SYSTOLIC BLOOD PRESSURE: 126 MMHG | WEIGHT: 207 LBS | OXYGEN SATURATION: 99 % | RESPIRATION RATE: 20 BRPM | HEIGHT: 61 IN | BODY MASS INDEX: 39.08 KG/M2 | HEART RATE: 50 BPM | TEMPERATURE: 98 F | DIASTOLIC BLOOD PRESSURE: 70 MMHG

## 2025-03-28 DIAGNOSIS — M54.50 ACUTE RIGHT-SIDED LOW BACK PAIN WITHOUT SCIATICA: Primary | ICD-10-CM

## 2025-03-28 PROCEDURE — G2211 COMPLEX E/M VISIT ADD ON: HCPCS | Performed by: STUDENT IN AN ORGANIZED HEALTH CARE EDUCATION/TRAINING PROGRAM

## 2025-03-28 PROCEDURE — 3008F BODY MASS INDEX DOCD: CPT | Performed by: STUDENT IN AN ORGANIZED HEALTH CARE EDUCATION/TRAINING PROGRAM

## 2025-03-28 PROCEDURE — 3074F SYST BP LT 130 MM HG: CPT | Performed by: STUDENT IN AN ORGANIZED HEALTH CARE EDUCATION/TRAINING PROGRAM

## 2025-03-28 PROCEDURE — 99214 OFFICE O/P EST MOD 30 MIN: CPT | Performed by: STUDENT IN AN ORGANIZED HEALTH CARE EDUCATION/TRAINING PROGRAM

## 2025-03-28 PROCEDURE — 3078F DIAST BP <80 MM HG: CPT | Performed by: STUDENT IN AN ORGANIZED HEALTH CARE EDUCATION/TRAINING PROGRAM

## 2025-03-28 RX ORDER — CYCLOBENZAPRINE HCL 5 MG
TABLET ORAL NIGHTLY
Qty: 20 TABLET | Refills: 0 | Status: SHIPPED | OUTPATIENT
Start: 2025-03-28

## 2025-03-28 RX ORDER — PREDNISONE 20 MG/1
TABLET ORAL
Qty: 9 TABLET | Refills: 0 | Status: SHIPPED | OUTPATIENT
Start: 2025-03-28 | End: 2025-03-28

## 2025-03-28 RX ORDER — PREDNISONE 20 MG/1
TABLET ORAL
Qty: 12 TABLET | Refills: 0 | Status: SHIPPED | OUTPATIENT
Start: 2025-03-28 | End: 2025-04-06

## 2025-03-28 NOTE — PROGRESS NOTES
OFFICE NOTE       The following individual(s) verbally consented to be recorded using ambient AI listening technology and understand that they can each withdraw their consent to this listening technology at any point by asking the clinician to turn off or pause the recording:    Patient name: Vitalii Gerasymenko  Additional names:  Gerasymenko,Tisha and Patient's Daughter           Patient ID: Vitalii Gerasymenko is a 74 year old male.  Today's Date: 03/28/25  Chief Complaint: Nerves (Pinched right side of back notice yesterday pain level 8/10 hurts more with movement inflammation of nerve in back.  /Was , received injections in back 8- 10 years ago /)      History of Present Illness  Vitalii Gerasymenko is a 74 year old male who presents with acute upper back pain. He is accompanied by his wife.    He has been experiencing acute upper back pain on the right side for two days, which began after a shopping trip. The pain is severe, preventing him from getting up without assistance. It is localized, non-radiating, and described as sudden and intense, subsiding with relaxation. The pain intensity is about 4-5 out of 10 when moving.    He has a history of lumbar pain and received injections for back pain about seven or eight years ago in [facility]. He notes that the current pain is different and more severe than his past lumbar pain, describing it as a wave of pain that comes and goes.    He has been using Voltaren cream, applied several times, and took a tablet at 9 PM, which allowed him to sleep until 4 AM without significant pain. He usually sleeps on his left side but had to sleep on his back due to the pain, which also caused increased snoring as noted by his wife.    His current medications include metoprolol, omeprazole, and atorvastatin.    No changes in bowel or bladder function, and no blood in urine. Slight numbness in the leg is noted, but it is not as severe as previous episodes. No  significant weakness in the legs.       Vitals:    03/28/25 0959   Weight: 207 lb (93.9 kg)   Height: 5' 1\" (1.549 m)     body mass index is 39.11 kg/m².  BP Readings from Last 3 Encounters:   03/05/25 126/66   02/10/25 120/70     The 10-year ASCVD risk score (Etienne STEEL, et al., 2019) is: 25.1%    Values used to calculate the score:      Age: 74 years      Sex: Male      Is Non- : No      Diabetic: No      Tobacco smoker: No      Systolic Blood Pressure: 126 mmHg      Is BP treated: Yes      HDL Cholesterol: 56 mg/dL      Total Cholesterol: 234 mg/dL  Results  LABS  Creatinine: 1.47 mg/dL    RADIOLOGY  Renal ultrasound: Normal       Medications reviewed:  Current Outpatient Medications   Medication Sig Dispense Refill    memantine 10 MG Oral Tab Take 1 tablet (10 mg total) by mouth at bedtime.      atorvastatin 20 MG Oral Tab Take 1 tablet (20 mg total) by mouth nightly. 90 tablet 1    tadalafil 5 MG Oral Tab Take 1 tablet (5 mg total) by mouth daily.      metoprolol succinate ER 25 MG Oral Tablet 24 Hr Take 1 tablet (25 mg total) by mouth daily. (Patient not taking: Reported on 3/5/2025)      Omeprazole 40 MG Oral Capsule Delayed Release Take 1 capsule (40 mg total) by mouth before breakfast. 90 capsule 0       Assessment & Plan  Acute upper back pain  Acute upper back pain localized to the right side, with intermittent severe episodes impairing mobility. Pain is non-radiating, without bowel or bladder dysfunction. He describes this episode as more severe and different from previous lumbar pain. Sudden onset pain subsides with relaxation. No significant weakness or sensory loss in the legs, but slight intermittent numbness is present. Concern for potential red flags such as groin numbness or urinary incontinence, which would necessitate urgent imaging. Prednisone is chosen over ibuprofen due to lack of renal side effects, though it may cause gastrointestinal issues.  - Prescribe prednisone  for 9 days to reduce inflammation.  - Prescribe a muscle relaxant for nocturnal use.  - Advise against ibuprofen due to potential nephrotoxicity.  - Refer to a specialist if symptoms persist.  - Advise on physical therapy for back exercises in 1-2 weeks.  - Instruct to report new symptoms such as groin numbness or urinary issues immediately.    Elevated creatinine level  Creatinine level elevated at 1.47, above normal maximum of 1.3, indicating potential renal issues. May be age-related but requires monitoring. He primarily drinks tea and is advised to increase water intake to at least 2 liters per day to support renal function. Prednisone is selected as it does not adversely affect renal function.  - Advise to drink at least 2 liters of water daily.  - Recheck renal function after current treatment for back pain.    Scheduled colonoscopy and upper endoscopy  Colonoscopy and upper endoscopy scheduled for April 8th. Preparation medications have been purchased.  - Proceed with scheduled colonoscopy and upper endoscopy on April 8th.       Follow Up: As needed/if symptoms worsen or No follow-ups on file..     I spent 30 minutes obtaining pertitent medical history, reviewing pertinent imaging/labs and specialists notes, evaluating patient, discussing differential diagnosis' and various treatment options, reinforcing importance of compliance with treatment plan, and completing documentation.          There is a longitudinal care relationship with me, the care plan reflects the ongoing nature of the continuous relationship of care, and the medical record indicates that there is ongoing treatment of a serious/complex medical condition which I am currently managing.  is Applicable.     Objective/ Results:   Physical Exam  Vitals reviewed.   Constitutional:       General: He is not in acute distress.     Appearance: Normal appearance. He is not ill-appearing.   HENT:      Head: Normocephalic and atraumatic.   Eyes:       Extraocular Movements: Extraocular movements intact.      Conjunctiva/sclera: Conjunctivae normal.      Pupils: Pupils are equal, round, and reactive to light.   Cardiovascular:      Rate and Rhythm: Normal rate and regular rhythm.      Heart sounds: No murmur heard.     No gallop.   Pulmonary:      Effort: Pulmonary effort is normal. No respiratory distress.      Breath sounds: Normal breath sounds. No stridor. No wheezing, rhonchi or rales.   Musculoskeletal:      Lumbar back: Spasms, tenderness and bony tenderness present. No swelling, edema, deformity, signs of trauma or lacerations. Decreased range of motion. Negative right straight leg raise test and negative left straight leg raise test.      Right lower leg: No edema.      Left lower leg: No edema.   Skin:     General: Skin is warm.      Capillary Refill: Capillary refill takes less than 2 seconds.   Neurological:      General: No focal deficit present.      Mental Status: He is alert and oriented to person, place, and time.   Psychiatric:         Mood and Affect: Mood normal.         Behavior: Behavior normal.         Thought Content: Thought content normal.         Judgment: Judgment normal.        Physical Exam  MUSCULOSKELETAL: Tenderness in the upper right back. Normal strength in lower extremities.  NEUROLOGICAL: Reflexes normal. Sensation intact throughout.     Reviewed:    Patient Active Problem List    Diagnosis    Pulmonary HTN (HCC)    Mitral valve insufficiency    Pulmonary valve insufficiency    POLA (obstructive sleep apnea)    Primary hypertension    Personal history of colon polyps, unspecified    Dysphagia    Gastroesophageal reflux disease    Prediabetes    Chronic obstructive pulmonary disease (HCC)      Allergies[1]     Social History     Socioeconomic History    Marital status:    Tobacco Use    Smoking status: Former     Types: Cigarettes    Smokeless tobacco: Former   Vaping Use    Vaping status: Never Used   Substance and Sexual  Activity    Alcohol use: Not Currently    Drug use: Never   Other Topics Concern    Caffeine Concern No    Exercise No    Seat Belt Yes    Special Diet No    Stress Concern No    Weight Concern No     Social Drivers of Health     Food Insecurity: High Risk (1/9/2023)    Received from [facility]    Food Insecurity     Have there been times that your food ran out, and you didn't have money to get more?: Yes     Are there times that you worry that this might happen?: No   Transportation Needs: Low Risk  (1/9/2023)    Received from [facility]    Transportation Needs     Do you have trouble getting transportation to medical appointments?: No   Housing Stability:   Recent Concern: Housing Stability - High Risk (1/9/2023)    Received from [facility]    Housing Stability     Are you concerned about having a safe and reliable place to live?: Yes      Review of Systems   Constitutional: Negative.    HENT: Negative.     Eyes: Negative.    Respiratory: Negative.     Cardiovascular: Negative.    Gastrointestinal: Negative.    Endocrine: Negative.    Genitourinary: Negative.    Musculoskeletal:  Positive for back pain.   Neurological: Negative.  Negative for weakness and numbness.       All other systems negative unless otherwise stated in ROS or HPI above.       Clare Leal MD  Internal Medicine       Call office with any questions or seek emergency care if necessary.   Patient understands and agrees to follow directions and advice.      ----------------------------------------- PATIENT INSTRUCTIONS-----------------------------------------     There are no Patient Instructions on file for this visit.        The 21st Century Cures Act makes medical notes available to patients in the interest of transparency.  However, please be advised that this is a medical document.  It is intended as a peer to peer communication.  It is written in medical language and may  contain abbreviations or verbiage that are technical and unfamiliar.  It may appear blunt or direct.  Medical documents are intended to carry relevant information, facts as evident, and the clinical opinion of the practitioner.          [1] No Known Allergies

## 2025-03-30 PROBLEM — E78.2 MIXED HYPERLIPIDEMIA: Status: ACTIVE | Noted: 2025-03-30

## 2025-03-30 PROBLEM — E05.90 SUBCLINICAL HYPERTHYROIDISM: Status: ACTIVE | Noted: 2025-03-30

## 2025-03-30 PROBLEM — I36.1 NONRHEUMATIC TRICUSPID VALVE REGURGITATION: Status: ACTIVE | Noted: 2025-03-30

## 2025-04-02 ENCOUNTER — OFFICE VISIT (OUTPATIENT)
Facility: CLINIC | Age: 75
End: 2025-04-02
Payer: COMMERCIAL

## 2025-04-02 VITALS
WEIGHT: 205 LBS | HEART RATE: 56 BPM | DIASTOLIC BLOOD PRESSURE: 66 MMHG | RESPIRATION RATE: 16 BRPM | HEIGHT: 71 IN | OXYGEN SATURATION: 98 % | SYSTOLIC BLOOD PRESSURE: 114 MMHG | BODY MASS INDEX: 28.7 KG/M2

## 2025-04-02 DIAGNOSIS — R05.3 CHRONIC COUGH: Primary | ICD-10-CM

## 2025-04-02 DIAGNOSIS — J43.2 CENTRILOBULAR EMPHYSEMA (HCC): ICD-10-CM

## 2025-04-02 PROCEDURE — 3078F DIAST BP <80 MM HG: CPT | Performed by: INTERNAL MEDICINE

## 2025-04-02 PROCEDURE — 3074F SYST BP LT 130 MM HG: CPT | Performed by: INTERNAL MEDICINE

## 2025-04-02 PROCEDURE — 99204 OFFICE O/P NEW MOD 45 MIN: CPT | Performed by: INTERNAL MEDICINE

## 2025-04-02 PROCEDURE — 3008F BODY MASS INDEX DOCD: CPT | Performed by: INTERNAL MEDICINE

## 2025-04-02 RX ORDER — IPRATROPIUM BROMIDE 42 UG/1
1 SPRAY, METERED NASAL NIGHTLY
Qty: 1 EACH | Refills: 0 | Status: SHIPPED | OUTPATIENT
Start: 2025-04-02

## 2025-04-02 NOTE — PROGRESS NOTES
The following individual(s) verbally consented to be recorded using ambient AI listening technology and understand that they can each withdraw their consent to this listening technology at any point by asking the clinician to turn off or pause the recording:    Patient name: Vitalii Gerasymenko  Additional names:  norma - wife

## 2025-04-02 NOTE — PROGRESS NOTES
[facility] General Pulmonary Consult Note    Chief Complaint:  Chief Complaint   Patient presents with    New Patient     Pt c/o productive cough and trouble breathing while laying down x6 months    [place]  used for entireity of visit    History of Present Illness:  History of Present Illness  Vitalii Gerasymenko is a 75 year old male with chronic obstructive pulmonary disease who presents with mucus accumulation in the throat and nocturnal cough.    He experiences mucus accumulation in his throat, particularly at night, which disrupts his sleep as he frequently wakes up to cough it up. This mucus accumulation causes difficulty in breathing properly during the night.    Several years ago, he was hospitalized in [facility] and diagnosed with chronic obstructive pulmonary disease (COPD), stage one, category A. He was prescribed an inhaler, which he used for one month.    He quit smoking approximately twenty years ago and denies smoking since then.    He mentions a family history of respiratory issues, noting that his mother had asthma and used an inhaler for her condition.      Past Medical History:   Past Medical History:    Essential hypertension        Past Surgical History: History reviewed. No pertinent surgical history.    Family Medical History:   Family History   Problem Relation Age of Onset    Cancer Mother     Diabetes Daughter         Social History:   Social History     Socioeconomic History    Marital status:      Spouse name: Not on file    Number of children: Not on file    Years of education: Not on file    Highest education level: Not on file   Occupational History    Not on file   Tobacco Use    Smoking status: Former     Types: Cigarettes    Smokeless tobacco: Former   Vaping Use    Vaping status: Never Used   Substance and Sexual Activity    Alcohol use: Not Currently    Drug use: Never    Sexual activity: Not on file   Other Topics Concern    Caffeine Concern No    Exercise No    Seat Belt  Yes    Special Diet No    Stress Concern No    Weight Concern No   Social History Narrative    Not on file     Social Drivers of Health     Food Insecurity: High Risk (1/9/2023)    Received from [facility]    Food Insecurity     Have there been times that your food ran out, and you didn't have money to get more?: Yes     Are there times that you worry that this might happen?: No   Transportation Needs: Low Risk  (1/9/2023)    Received from [facility]    Transportation Needs     Do you have trouble getting transportation to medical appointments?: No     How do you normally get to and from your appointments?: Not on file   Stress: Not on file   Housing Stability: Not on file (1/9/2023)   Recent Concern: Housing Stability - High Risk (1/9/2023)    Received from [facility]    Housing Stability     Are you concerned about having a safe and reliable place to live?: Yes        Allergies: Patient has no known allergies.     Medications:   Current Outpatient Medications   Medication Sig Dispense Refill    ipratropium 0.06 % Nasal Solution 1 spray by Nasal route nightly. 1 sprays in each nostril nightly 1 each 0    Tiotropium Bromide-Olodaterol 2.5-2.5 MCG/ACT Inhalation Aero Soln Inhale 2 puffs into the lungs daily. 3 each 3    cyclobenzaprine 5 MG Oral Tab Take 1-2 tablets (5-10 mg total) by mouth nightly. 20 tablet 0    predniSONE 20 MG Oral Tab Take 2 tablets (40 mg total) by mouth daily for 3 days, THEN 1.5 tablets (30 mg total) daily for 2 days, THEN 1 tablet (20 mg total) daily for 2 days, THEN 0.5 tablets (10 mg total) daily for 2 days. 12 tablet 0    memantine 10 MG Oral Tab Take 1 tablet (10 mg total) by mouth at bedtime.      atorvastatin 20 MG Oral Tab Take 1 tablet (20 mg total) by mouth nightly. 90 tablet 1    tadalafil 5 MG Oral Tab Take 1 tablet (5 mg total) by mouth daily.      Omeprazole 40 MG Oral Capsule Delayed Release Take 1 capsule (40 mg  total) by mouth before breakfast. 90 capsule 0    metoprolol succinate ER 25 MG Oral Tablet 24 Hr Take 1 tablet (25 mg total) by mouth daily. (Patient not taking: Reported on 4/2/2025)         Review of Systems: Review of Systems    Physical Exam:  /66 (BP Location: Right arm, Patient Position: Sitting, Cuff Size: adult)   Pulse 56   Resp 16   Ht 5' 11\" (1.803 m)   Wt 205 lb (93 kg)   SpO2 98%   BMI 28.59 kg/m²      Constitutional: alert, cooperative. No acute distress.  HEENT: Head NC/AT. Nares normal. Septum midline. Mucosa normal. No drainage or sinus tenderness.. Mallampati 2+  Cardio: Regular rate and rhythm. Normal S1 and S2. No murmurs.   Respiratory: Thorax symmetrical with no labored breathing. clear to auscultation bilaterally  GI: NABS. Abd soft, non-tender.  Extremities: No clubbing or cyanosis. No BLE edema.    Neurologic: A&Ox3. No gross motor deficits.  Skin: Warm, dry  Psych: Calm, cooperative. Pleasant affect.    Results:  Images personally reviewed - my own review dictated as below  Results  RADIOLOGY  Chest CT: Chronic obstructive pulmonary disease (COPD), stage I, category A  WBC: 4.9, done on 2/11/2025.HGB: 14.8, done on 2/11/2025.PLT: 218, done on 2/11/2025.     Glucose: 94, done on 3/5/2025.Cr: 1.47, done on 3/5/2025.Last eGFR was 50 on 3/5/2025.CA: 9.2, done on 3/5/2025.Na: 141, done on 3/5/2025.K: 4.1, done on 3/5/2025.Cl: 105, done on 3/5/2025.CO2: 28, done on 3/5/2025.Last ALB was 4.5% done on 3/5/2025.     US KIDNEY/BLADDER (LOK=04919)  Result Date: 3/20/2025  CONCLUSION:  No acute renal findings.   LOCATION:  [facility]     Dictated by (CST): Aly Mckeon MD on 3/20/2025 at 7:54 AM     Finalized by (CST): Aly Mckeon MD on 3/20/2025 at 7:55 AM        Assessment/Plan:  Assessment & Plan  Chronic Obstructive Pulmonary Disease (COPD)  COPD stage one, category A with symptoms of mucus, cough, and dyspnea. Previous inhaler use noted. Smoking cessation 20 years ago  positively impacts lung health.  - Prescribe Stiolto inhaler daily.  - Prescribe Atrovent nasal spray for nighttime.  - Order CT scan to evaluate lung status.    Lung Cancer Surveillance/Screening  Lung Cancer Counseling and Shared Decision Making Session in an Asymptomatic Smoker/Former Smoker   Vitalii Gerasymenko is a 75 year old male without current symptoms of lung cancer.  History   Smoking Status    Former    Types: Cigarettes   Smokeless Tobacco    Former        He received information on the importance of adherence to annual lung cancer Low Dose CT (LDCT) screening, the impact of his comorbidities and his ability or willingness to undergo diagnosis and treatment. he is in agreement and an order will be placed for CT LUNG LD SCREENING(CPT=71271).    We counseled the importance of maintaining cigarette smoking abstinence if he is a former smoker and the importance of smoking cessation if he is a current smoker and we discussed and furnished information about tobacco cessation interventions.    Return in about 4 weeks (around 4/30/2025).    Nneka Bailey MD  4/2/2025

## 2025-04-04 ENCOUNTER — TELEPHONE (OUTPATIENT)
Dept: INTERNAL MEDICINE CLINIC | Facility: CLINIC | Age: 75
End: 2025-04-04

## 2025-04-04 RX ORDER — PREDNISONE 20 MG/1
20 TABLET ORAL DAILY
Qty: 3 TABLET | Refills: 0 | Status: SHIPPED | OUTPATIENT
Start: 2025-04-04 | End: 2025-04-07

## 2025-04-04 NOTE — TELEPHONE ENCOUNTER
Filipe from [facility] calling regarding the Prednisone prescription sent in. He is stating that the quantity and instructions are different. She is just reaching out to talk to someone to see if they are correct.

## 2025-04-04 NOTE — TELEPHONE ENCOUNTER
Called the patient . He only needs 3 more tabs. He already picked up 9 tablets.     The correct dosing should be as follows:  40 mg, Daily Starting Fri 3/28/2025, For 3 days, THEN 30 mg, Daily Starting Mon 3/31/2025, For 2 days, THEN 20 mg, Daily Starting Wed 4/2/2025, For 2 days, THEN 10 mg, Daily Starting Fri 4/4/2025, For 2 days         I tried calling the pharmacy 2 times. No one picked up.  I sent Prednisone 20 mg - 3 tabs to the pharmacy.

## 2025-04-04 NOTE — TELEPHONE ENCOUNTER
12 tablets sent but with instructions it should add up to 10. Please confirm sig/quantity, thank you.

## 2025-04-09 ENCOUNTER — TELEPHONE (OUTPATIENT)
Dept: CASE MANAGEMENT | Age: 75
End: 2025-04-09

## 2025-04-09 NOTE — TELEPHONE ENCOUNTER
CT Chest  I spoke to [facility] nurse this is not meeting criteria and a peer to peer is being request [phone] Case # [id number]    Insurance company is looking for past medical treatment Trail of medication ect...  2. No X-ray     I did send PFTS & The CT 2023 they are looking for more current treatment  And medication Trails prior to this visit

## 2025-04-09 NOTE — TELEPHONE ENCOUNTER
Patient called with the help of the language line  Mey # [id number]. Left detailed message making aware his CT chest order has been canceled due to insurance not covering test, advised to do chest xray order as ordered and we will follow up on those results.

## 2025-04-09 NOTE — TELEPHONE ENCOUNTER
Spoke to Dr Thakkar from [facility] [employer]  Case # [id number]  CT Chest denies due to no CXR performed  Could not authorize and approve at this time    Next steps CXR, see Dr Bailey and can then order Ct chest.     Will update patient

## 2025-04-16 ENCOUNTER — TELEPHONE (OUTPATIENT)
Facility: CLINIC | Age: 75
End: 2025-04-16

## 2025-04-16 ENCOUNTER — HOSPITAL ENCOUNTER (OUTPATIENT)
Dept: GENERAL RADIOLOGY | Facility: HOSPITAL | Age: 75
Discharge: HOME | End: 2025-04-16
Payer: COMMERCIAL

## 2025-04-16 DIAGNOSIS — J43.2 CENTRILOBULAR EMPHYSEMA (HCC): ICD-10-CM

## 2025-04-16 DIAGNOSIS — R05.3 CHRONIC COUGH: Primary | ICD-10-CM

## 2025-04-16 DIAGNOSIS — R91.1 LUNG NODULE: ICD-10-CM

## 2025-04-16 DIAGNOSIS — R05.3 CHRONIC COUGH: ICD-10-CM

## 2025-04-16 PROCEDURE — 71046 X-RAY EXAM CHEST 2 VIEWS: CPT

## 2025-04-16 NOTE — TELEPHONE ENCOUNTER
Can someone please call him to schedule ct chest before he sees LIYAH in may. Thank you     Patient called with the assistance of an Ukranian  #[id number]. Patient made aware insurance would not cover CT scan, wants chest xray done first. Now that patient has had chest xray done and it is abnormal, will now try to appeal to get CT chest approved. Patient advised will discuss with Snehal, work on the appeal and contact patient once CT has been authorized. Patient agreeable with plan.

## 2025-04-17 NOTE — TELEPHONE ENCOUNTER
[facility] called. Spoke with Maryanne. Maryanne made aware chest xray has been completed, calling to obtain authorization on CT chest now. Per Maryanne, case # [id number] has been closed since peer to peer review has been done. Options were given to either appeal or open a new case. New case was open, series of answers were asked, CPT code, diagnosis ICD code, reason for CT. Authorization was obtained starting 4/17/2025. Case # [id number]. Patient may get CT done at [facility] with an approximate cost of $575 or patient may get it done at [facility] [address]  [address] [place] [address] [phone] or [facility] [id number] [place] [address] [address] [facility] [address]. [phone]. Patient referral representative notified, request made to update referral.     Patient called with the assistance of Alexander Sherwood  ID # [id number]. Made aware of the information above. Made aware has until 6/15/2025 to complete CT scan. Explained prior authorization is not guarantee of payment. Prior authorization just means we have proved that the test is needed. He would have to call his insurance regarding deductible and cost of test. Authorization number provided. Discussed at length how insurance works. Patient undecided where he would go to get CT scan done. Patient made aware I will fax the referral to both [facility] locations above and he can call them tomorrow to schedule the test at whichever location he prefers. Made aware he will need to ask for the report and a copy of the disc with the imaging, as we don't have access to their system. Once he has obtained the disc, he would need to drop it off to the office, it then has to be uploaded before it can be reviewed with the doctor. Patient verbalized understanding.     Order, authorization note and insurance information faxed to [place] Location fax # [phone] and [facility] [phone]

## 2025-04-21 ENCOUNTER — TELEPHONE (OUTPATIENT)
Facility: CLINIC | Age: 75
End: 2025-04-21

## 2025-04-21 NOTE — TELEPHONE ENCOUNTER
Left recorded message with Language line  Joce # [id number]. Instructing patient to call us back regarding questions with your CT scan.

## 2025-04-30 ENCOUNTER — TELEPHONE (OUTPATIENT)
Facility: CLINIC | Age: 75
End: 2025-04-30

## 2025-04-30 NOTE — TELEPHONE ENCOUNTER
Joseph called. Per Joseph, would need more recent bmp or cmp due to patient's kidney function levels from March 2025. Patient's daughter called. Left detailed message making aware of the above. Made aware patient needs to get BMP done as ordered by PCP. However, does not guarantee he can get it done. [facility] CT staff will have to look at kidney function again and determine if can do test with contrast. Daughter advised to return my call to further discuss.

## 2025-05-02 ENCOUNTER — TELEPHONE (OUTPATIENT)
Dept: INTERNAL MEDICINE CLINIC | Facility: CLINIC | Age: 75
End: 2025-05-02

## 2025-05-02 NOTE — TELEPHONE ENCOUNTER
Patient's daughter called back and information given for patient to have a repeat BMP to recheck his kidneys.  Patient can make an appointment at the hospital or outpatient lab.

## 2025-05-02 NOTE — TELEPHONE ENCOUNTER
Patients daughter calling because her parents are confused on the blood work instructions for the Basic Metabolic panel. I informed them that there should be no food or drink for at least 8 hours but they wanted to talk to Dr Leal. Please advise thanks!

## 2025-05-05 ENCOUNTER — LAB ENCOUNTER (OUTPATIENT)
Dept: LAB | Age: 75
End: 2025-05-05
Attending: STUDENT IN AN ORGANIZED HEALTH CARE EDUCATION/TRAINING PROGRAM
Payer: COMMERCIAL

## 2025-05-05 DIAGNOSIS — R79.89 ELEVATED SERUM CREATININE: ICD-10-CM

## 2025-05-05 LAB
ANION GAP SERPL CALC-SCNC: 9 MMOL/L (ref 0–18)
BUN BLD-MCNC: 17 MG/DL (ref 9–23)
CALCIUM BLD-MCNC: 9.9 MG/DL (ref 8.7–10.6)
CHLORIDE SERPL-SCNC: 103 MMOL/L (ref 98–112)
CO2 SERPL-SCNC: 28 MMOL/L (ref 21–32)
CREAT BLD-MCNC: 1.37 MG/DL (ref 0.7–1.3)
EGFRCR SERPLBLD CKD-EPI 2021: 54 ML/MIN/1.73M2 (ref 60–?)
FASTING STATUS PATIENT QL REPORTED: NO
GLUCOSE BLD-MCNC: 110 MG/DL (ref 70–99)
OSMOLALITY SERPL CALC.SUM OF ELEC: 292 MOSM/KG (ref 275–295)
POTASSIUM SERPL-SCNC: 4.1 MMOL/L (ref 3.5–5.1)
SODIUM SERPL-SCNC: 140 MMOL/L (ref 136–145)

## 2025-05-05 PROCEDURE — 80048 BASIC METABOLIC PNL TOTAL CA: CPT | Performed by: STUDENT IN AN ORGANIZED HEALTH CARE EDUCATION/TRAINING PROGRAM

## 2025-05-08 DIAGNOSIS — K21.9 GASTROESOPHAGEAL REFLUX DISEASE, UNSPECIFIED WHETHER ESOPHAGITIS PRESENT: ICD-10-CM

## 2025-05-08 DIAGNOSIS — R13.10 DYSPHAGIA, UNSPECIFIED TYPE: ICD-10-CM

## 2025-05-12 RX ORDER — OMEPRAZOLE 40 MG/1
40 CAPSULE, DELAYED RELEASE ORAL
Qty: 90 CAPSULE | Refills: 0 | Status: SHIPPED | OUTPATIENT
Start: 2025-05-12

## 2025-05-12 NOTE — TELEPHONE ENCOUNTER
Gastrointestional Medication Protocol [id number]/08/2025 01:40 PM   Protocol Details In person appointment or virtual visit in the past 12 mos or appointment in next 3 mos    Medication is active on med list        Future Appointments   Date Time Provider Department Center   5/19/2025  3:15 PM Nneka Bailey MD [id number] [place]

## 2025-05-13 ENCOUNTER — TELEPHONE (OUTPATIENT)
Dept: INTERNAL MEDICINE CLINIC | Facility: CLINIC | Age: 75
End: 2025-05-13

## 2025-05-14 ENCOUNTER — MED REC SCAN ONLY (OUTPATIENT)
Facility: CLINIC | Age: 75
End: 2025-05-14

## 2025-05-19 ENCOUNTER — OFFICE VISIT (OUTPATIENT)
Facility: CLINIC | Age: 75
End: 2025-05-19
Payer: COMMERCIAL

## 2025-05-19 ENCOUNTER — OFFICE VISIT (OUTPATIENT)
Dept: INTERNAL MEDICINE CLINIC | Facility: CLINIC | Age: 75
End: 2025-05-19
Payer: COMMERCIAL

## 2025-05-19 ENCOUNTER — TELEPHONE (OUTPATIENT)
Dept: INTERNAL MEDICINE CLINIC | Facility: CLINIC | Age: 75
End: 2025-05-19

## 2025-05-19 VITALS
HEIGHT: 71 IN | RESPIRATION RATE: 16 BRPM | BODY MASS INDEX: 28.7 KG/M2 | OXYGEN SATURATION: 96 % | DIASTOLIC BLOOD PRESSURE: 68 MMHG | WEIGHT: 205 LBS | HEART RATE: 82 BPM | SYSTOLIC BLOOD PRESSURE: 140 MMHG

## 2025-05-19 VITALS
BODY MASS INDEX: 28.7 KG/M2 | RESPIRATION RATE: 16 BRPM | TEMPERATURE: 98 F | HEART RATE: 61 BPM | WEIGHT: 205 LBS | OXYGEN SATURATION: 97 % | HEIGHT: 71 IN | DIASTOLIC BLOOD PRESSURE: 64 MMHG | SYSTOLIC BLOOD PRESSURE: 118 MMHG

## 2025-05-19 DIAGNOSIS — H04.203 EYE TEARING, BILATERAL: ICD-10-CM

## 2025-05-19 DIAGNOSIS — B35.1 TOENAIL FUNGUS: ICD-10-CM

## 2025-05-19 DIAGNOSIS — N52.9 ERECTILE DYSFUNCTION, UNSPECIFIED ERECTILE DYSFUNCTION TYPE: ICD-10-CM

## 2025-05-19 DIAGNOSIS — I36.1 NONRHEUMATIC TRICUSPID VALVE REGURGITATION: ICD-10-CM

## 2025-05-19 DIAGNOSIS — I27.20 PULMONARY HTN (HCC): ICD-10-CM

## 2025-05-19 DIAGNOSIS — G47.33 OSA (OBSTRUCTIVE SLEEP APNEA): ICD-10-CM

## 2025-05-19 DIAGNOSIS — K21.00 GASTROESOPHAGEAL REFLUX DISEASE WITH ESOPHAGITIS WITHOUT HEMORRHAGE: ICD-10-CM

## 2025-05-19 DIAGNOSIS — I34.0 MITRAL VALVE INSUFFICIENCY, UNSPECIFIED ETIOLOGY: ICD-10-CM

## 2025-05-19 DIAGNOSIS — K22.89 ESOPHAGEAL THICKENING: ICD-10-CM

## 2025-05-19 DIAGNOSIS — R91.8 LUNG NODULES: ICD-10-CM

## 2025-05-19 DIAGNOSIS — J44.9 CHRONIC OBSTRUCTIVE PULMONARY DISEASE, UNSPECIFIED COPD TYPE (HCC): ICD-10-CM

## 2025-05-19 DIAGNOSIS — H26.9 CATARACT OF BOTH EYES, UNSPECIFIED CATARACT TYPE: ICD-10-CM

## 2025-05-19 DIAGNOSIS — I10 PRIMARY HYPERTENSION: Primary | ICD-10-CM

## 2025-05-19 DIAGNOSIS — N40.1 BENIGN PROSTATIC HYPERPLASIA WITH LOWER URINARY TRACT SYMPTOMS, SYMPTOM DETAILS UNSPECIFIED: ICD-10-CM

## 2025-05-19 DIAGNOSIS — J44.9 CHRONIC OBSTRUCTIVE PULMONARY DISEASE, UNSPECIFIED COPD TYPE (HCC): Primary | ICD-10-CM

## 2025-05-19 DIAGNOSIS — H57.9 ITCH OF EYE: ICD-10-CM

## 2025-05-19 PROCEDURE — 3074F SYST BP LT 130 MM HG: CPT | Performed by: STUDENT IN AN ORGANIZED HEALTH CARE EDUCATION/TRAINING PROGRAM

## 2025-05-19 PROCEDURE — 3008F BODY MASS INDEX DOCD: CPT | Performed by: INTERNAL MEDICINE

## 2025-05-19 PROCEDURE — 3077F SYST BP >= 140 MM HG: CPT | Performed by: INTERNAL MEDICINE

## 2025-05-19 PROCEDURE — 3008F BODY MASS INDEX DOCD: CPT | Performed by: STUDENT IN AN ORGANIZED HEALTH CARE EDUCATION/TRAINING PROGRAM

## 2025-05-19 PROCEDURE — 3078F DIAST BP <80 MM HG: CPT | Performed by: STUDENT IN AN ORGANIZED HEALTH CARE EDUCATION/TRAINING PROGRAM

## 2025-05-19 PROCEDURE — 99214 OFFICE O/P EST MOD 30 MIN: CPT | Performed by: INTERNAL MEDICINE

## 2025-05-19 PROCEDURE — 3078F DIAST BP <80 MM HG: CPT | Performed by: INTERNAL MEDICINE

## 2025-05-19 PROCEDURE — 99215 OFFICE O/P EST HI 40 MIN: CPT | Performed by: STUDENT IN AN ORGANIZED HEALTH CARE EDUCATION/TRAINING PROGRAM

## 2025-05-19 RX ORDER — FAMOTIDINE 40 MG/1
40 TABLET, FILM COATED ORAL DAILY
COMMUNITY
End: 2025-05-19

## 2025-05-19 RX ORDER — TADALAFIL 5 MG/1
5 TABLET ORAL
Qty: 30 TABLET | Refills: 1 | Status: SHIPPED | OUTPATIENT
Start: 2025-05-19

## 2025-05-19 RX ORDER — METFORMIN HYDROCHLORIDE 500 MG/1
500 TABLET, EXTENDED RELEASE ORAL
COMMUNITY
Start: 2025-05-09

## 2025-05-19 NOTE — PROGRESS NOTES
[facility] Pulmonary Follow Up Note    Chief Complaint:  Chief Complaint   Patient presents with    Follow - Up     Pt here for f/u on CT,        History of Present Illness:  History of Present Illness  Vitalii Gerasymenko is a 75 year old male with a history of smoking-related lung damage who presents for follow-up of his pulmonary condition.    He currently experiences no breathing issues and reports normal respiratory function. Previously, he had fluid accumulation that required coughing to clear, but this has resolved. No shortness of breath or other respiratory symptoms are present.    He was prescribed Stiolto, a new inhaler, during his last visit but has not been using it. Despite this, he feels that his breathing is currently stable.    A recent CT scan showed mild smoking-related lung damage and very small nodules. He is concerned about the timing of the follow-up, questioning if a year might be too long. The CT scan also revealed some thickening of the esophagus.    He inquires about the safety of using a sauna or steam room, wondering if it would affect his lungs.         Past Medical History:   Past Medical History[1]     Past Surgical History:   Past Surgical History[2]    Family Medical History: Family History[3]     Social History:   Social History     Socioeconomic History    Marital status:      Spouse name: Not on file    Number of children: Not on file    Years of education: Not on file    Highest education level: Not on file   Occupational History    Not on file   Tobacco Use    Smoking status: Former     Types: Cigarettes     Passive exposure: Past    Smokeless tobacco: Former   Vaping Use    Vaping status: Never Used   Substance and Sexual Activity    Alcohol use: Not Currently    Drug use: Never    Sexual activity: Not on file   Other Topics Concern    Caffeine Concern No    Exercise No    Seat Belt Yes    Special Diet No    Stress Concern No    Weight Concern No   Social History Narrative     Not on file     Social Drivers of Health     Food Insecurity: High Risk (1/9/2023)    Received from [facility]    Food Insecurity     Have there been times that your food ran out, and you didn't have money to get more?: Yes     Are there times that you worry that this might happen?: No   Transportation Needs: Low Risk  (1/9/2023)    Received from [facility]    Transportation Needs     Do you have trouble getting transportation to medical appointments?: No     How do you normally get to and from your appointments?: Not on file   Stress: Not on file   Housing Stability: Not on file (1/9/2023)   Recent Concern: Housing Stability - High Risk (1/9/2023)    Received from [facility]    Housing Stability     Are you concerned about having a safe and reliable place to live?: Yes        Medications: Current Medications[4]    Review of Systems: Review of Systems     Physical Exam:  /68 (BP Location: Left arm, Patient Position: Sitting, Cuff Size: adult)   Pulse 82   Resp 16   Ht 5' 11\" (1.803 m)   Wt 205 lb (93 kg)   SpO2 96%   BMI 28.59 kg/m²      Constitutional: alert, cooperative. No acute distress.  HEENT: Head NC/AT. Nares normal. Septum midline. Mucosa normal. No drainage or sinus tenderness.  Cardio: Regular rate and rhythm. Normal S1 and S2. No murmurs.   Respiratory: Thorax symmetrical with no labored breathing. clear to auscultation bilaterally  Extremities: No clubbing or cyanosis. No BLE edema.    Neurologic: A&Ox3. No gross motor deficits.  Skin: Warm, dry  Psych: Calm, cooperative. Pleasant affect.    Results:  Images personally reviewed - reading is my own personal review  Results  RADIOLOGY  Chest CT: mild smoking-related lung damage, very small nodules  Esophagus CT: thickening of the esophagus       PFTs:       No data to display                   No data to display                    WBC: 4.9, done on 2/11/2025.  HGB: 14.8, done on  2/11/2025.  PLT: 218, done on 2/11/2025.     Glucose: 110, done on 5/5/2025.  Cr: 1.37, done on 5/5/2025.  Last eGFR was 54 on 5/5/2025.  CA: 9.9, done on 5/5/2025.  Na: 140, done on 5/5/2025.  K: 4.1, done on 5/5/2025.  Cl: 103, done on 5/5/2025.  CO2: 28, done on 5/5/2025.  Last ALB was 4.5% done on 3/5/2025.     XR CHEST PA + LAT CHEST (PUV=[id number])  Addendum Date: 4/16/2025  ADDENDUM:  Previously noted nodule in the left lower lobe is due to a nipple shadow.  No suspicious nodule on these chest x-rays is identified.  Dictated by (CST): Fish Kohler MD on 4/16/2025 at 3:29 PM     Finalized by (CST): Fish Kohler MD on 4/16/2025 at 3:30 PM             Result Date: 4/16/2025  CONCLUSION:  Left lower lung field 1 cm nodule is noted.  This may represent a nipple shadow.  A follow-up repeat frontal chest radiograph with nipple markers is recommended.   LOCATION:  [facility]   Dictated by (CST): Fish Kohler MD on 4/16/2025 at 10:57 AM     Finalized by (CST): Fish Kohler MD on 4/16/2025 at 10:59 AM       US KIDNEY/BLADDER (ILR=[id number])  Result Date: 3/20/2025  CONCLUSION:  No acute renal findings.   LOCATION:  [facility]     Dictated by (CST): Aly Mckeon MD on 3/20/2025 at 7:54 AM     Finalized by (CST): Aly Mckeon MD on 3/20/2025 at 7:55 AM          Assessment/Plan:  Assessment & Plan  Chronic Obstructive Pulmonary Disease (COPD)  Breathing normal, no dyspnea. CT shows mild smoking-related lung damage, small nodules, no cancer. Follow-up CT in one year recommended. Sauna use permissible.  - Report any dyspnea or respiratory difficulty.  - Schedule follow-up CT scan in one year to monitor lung nodules.  - Sauna or steam room use is permissible.    Esophageal thickening  CT shows esophageal thickening. Radiologist recommends gastroenterologist evaluation.  - Refer to gastroenterologist for evaluation of esophageal thickening.     Lung Cancer Surveillance/Screening  Lung Cancer Counseling and  Shared Decision Making Session in an Asymptomatic Smoker/Former Smoker   Vitalii Gerasymenko is a 75 year old male without current symptoms of lung cancer.  History   Smoking Status    Former    Types: Cigarettes   Smokeless Tobacco    Former        He received information on the importance of adherence to annual lung cancer Low Dose CT (LDCT) screening, the impact of his comorbidities and his ability or willingness to undergo diagnosis and treatment. he is in agreement and an order will be placed for CT LUNG LD SCREENING(CPT=71271).    We counseled the importance of maintaining cigarette smoking abstinence if he is a former smoker and the importance of smoking cessation if he is a current smoker and we discussed and furnished information about tobacco cessation interventions.    Return in about 3 months (around 8/19/2025).    I spent 35 minutes obtaining and reviewing records, preparing for the visit including reviewing chart and prior testing, face to face time examining the patient and obtaining history, counseling, arranging and reviewing office-based testing, independently reviewing relevant studies and documentation exclusive of other billable procedures.      Nneka Bailey MD  5/19/2025         [1]   Past Medical History:   Essential hypertension   [2] History reviewed. No pertinent surgical history.  [3]   Family History  Problem Relation Age of Onset    Cancer Mother     Diabetes Daughter    [4]   Current Outpatient Medications   Medication Sig Dispense Refill    metFORMIN  MG Oral Tablet 24 Hr Take 1 tablet (500 mg total) by mouth daily with breakfast.      tadalafil 5 MG Oral Tab Take 1 tablet (5 mg total) by mouth daily as needed for Erectile Dysfunction. 30 tablet 1    OMEPRAZOLE 40 MG Oral Capsule Delayed Release Take 1 capsule (40 mg total) by mouth before breakfast. 90 capsule 0    ipratropium 0.06 % Nasal Solution 1 spray by Nasal route nightly. 1 sprays in each nostril nightly 1  each 0    Tiotropium Bromide-Olodaterol 2.5-2.5 MCG/ACT Inhalation Aero Soln Inhale 2 puffs into the lungs daily. 3 each 3    memantine 10 MG Oral Tab Take 1 tablet (10 mg total) by mouth at bedtime.      atorvastatin 20 MG Oral Tab Take 1 tablet (20 mg total) by mouth nightly. 90 tablet 1    tadalafil 5 MG Oral Tab Take 1 tablet (5 mg total) by mouth daily.      metoprolol succinate ER 25 MG Oral Tablet 24 Hr Take 1 tablet (25 mg total) by mouth daily.

## 2025-05-19 NOTE — PROGRESS NOTES
Medical Record Release Documents have been Signed, Faxed and Confirmation Received.     Faxed to [facility] in [place] @ [phone]

## 2025-05-19 NOTE — PROGRESS NOTES
OFFICE NOTE       The following individual(s) verbally consented to be recorded using ambient AI listening technology and understand that they can each withdraw their consent to this listening technology at any point by asking the clinician to turn off or pause the recording:    Patient name: Vitalii Gerasymenko  Additional names:  Gerasymenko,Valentyna         Patient ID: Vitalii Gerasymenko is a 75 year old male.  Today's Date: 05/19/25  Chief Complaint: Referral (Urology, Eye Doctor )    History of Present Illness  Vitalii Gerasymenko is a 75-year-old male who presents with eye irritation and vision problems.    He has been experiencing significant eye irritation and itching for the past three to four months, describing the sensation as 'very itchy' and requiring frequent rubbing of his eyes. He has not experienced this issue before. Additionally, he has problems with his vision, particularly when driving, as he struggles to read indicators and signs. He uses glasses, which help to some extent.    He inquires about urological issues, specifically regarding the cleaning of blood vessels in the cavernous bodies to improve erectile function. He previously used tadalafil 5 mg daily, which he found effective, but he ran out of the medication about a month ago. He is interested in resuming this treatment.    He has a long-standing issue with a toenail, which he has had since school. The nail requires frequent filing, and he has tried tea tree oil without success. He suspects it might be a fungal infection but is unsure.    He is currently taking omeprazole 40 mg twice daily, atorvastatin 20 mg daily, and metoprolol 25 mg daily. He sometimes forgets the evening dose of omeprazole.    He quit smoking 20 years ago after smoking a pack a day for about 20 years.    He mentions a sensation of having a hair on his skin, which he and his wife both experience, although there is no actual hair present. This  sensation is transient and resolves after a few minutes.   No shortness of breath, nausea, vomiting, chest pain, leg pain, or swelling.     # GERD with esophagitis without bleeding  Was seeing GI in 03/2024. Was taking Metoprolol prescribed apparently by GI for his stomach discomfort (?)  Per record: E/C 03/2023 with GERD LA C, HH, gastritis (no HPylori), 2 <1 cm colon polyps, diverticulosis, hemorrhoids  3/2025: Taking Omeprazole 40 mg daily, planning repeat EGD/Colonoscopy in April 05/2025: EGD performed 4/8/2025: LA grade D esophagitis with no bleeding  Large hiatal hernia, normal stomach, normal examined duodenum.  Was recommended to take omeprazole 40 mg 2 times a day.  Was referred to surgeon for Transoral Incisionless Fundoplication  Esophageal biopsy showed mild chronic esophagitis.  No evidence of eosinophilic esophagitis, no evidence of Villeda's metaplasia.  No evidence of fungal organisms.     # Subclinical hyperthyroidism   From [place]. [place] was [email or web address] [place] in [place] area. He does not recall if he got any treatment at that time. Per pt he had Thyroid u/s done in 2021 in [place]/ some nodules were noted but the biopsy was not offered.   Thyroid US 5/3/23 Unremarkable thyroid ultrasound exam.   Thyroid scan:5/20/23 There is homogeneous uptake throughout the thyroid gland without evidence of a hot or cold nodule. The calculated uptake values were WNL  2/2025: TSH 0.407, T4 1.2. Asymptomatic  Follows with ENDO     # Prediabetes  2/2025: HbA1c -6.1      # HLD  2/2025: , HDL 56, ,      # CKD  He mentions a history of elevated creatinine levels but has no known diagnosis of chronic kidney disease. He recalls elevated creatinine levels in 2024, attributing it to medication use, No known kidney problems are reported.  Creatinine : 1.33 (2/11/2025) -> 1.47 (3/5/2025) - 1.37 (05/05/2025)  Ultrasound kidney bladder was done 3/20/2025 and showed no acute renal findings, no  bladder wall thickening and showed mild prostatic megaly measuring 3.8 x 4 x 4 cm  UA 3/26/2025 was normal, microalbumin/creatinine ratio was normal, protein creatinine ratio was normal.  BMP 5/5/2025 showed creatinine of 1.37 and GFR of 54     # Nonrheumatic mitral insufficiency   # Primary pulmonary HTN   # Pulmonary valve insufficiency   # Tricuspid valve insufficiency  ECHO 3/3/2025: EF of 65-70%, mild aortic sclerosis, mild mitral valve calcification, and mild - moderate tricuspid regurgitation. Pulm valve: Mild to moderate regurgitation. PA ressure elevated at 40 mmHg  Will need to see Cardiology, will provide referral again  Will need Sleep study     #POLA  # h/o Smoking - age 19 until age 55 - 1PPD - total 36 pack years  # Lung nodules  Has h/o sleep apnea with symptoms of snoring, daytime fatigue, and occasional nocturnal awakenings. He has not had a recent sleep study. Order was placed for him, however did not complete  CT 04/2025 shows mild smoking-related lung damage, small nodules, no cancer. Follow-up CT in one year recommended.   Pulmonology - Nneka Bailey MD     # ED  # BPH  He has a history of prostate issues and was taking tadalafil occasionally for urinary symptoms and improved sexual performance. Ran out of prescription  He has not had a recent follow-up with a urologist. Wants a referral.  PSA 3.08 ( 03/5/2025)       Diet: Could be better  Exercise:  Not exercsing regularly  Driving: yes, with the seatbelt  Smoking: Former  Alcohol: Sometimes  No other substance use.      Vaccinations:  Influenza: Does not want at this time   Pneumococcal: Due, Will provide the info  Shingles: Due  Tdap/Td: Due, will provide the info     Screenings:  Colonoscopy:  3/3/2023: 2 polyps in transversecolon, external and internal hemorrhoids, moderate diverticulosis in sigmoid and descending colon.  Repeat colonoscopy recommended 5 years  04/8/2025: Diverticulosis in the left colon, 1 small polyp in the  hepatic flexure (biopsy showed hyperplastic polyp), removed.  Incidental AVM in the ascending colon (left alone given no symptoms)  Recommended to repeat colonoscopy in 5 years for surveillance  PSA: 3.08 (3/5/2025)   AAA ultrasound screen? (age 65-75 with any smoking history): Will need, will order next visit  Aspirin use? (age 50-59 with ASCVD risk 10% or greater): might need to consider.        The 10-year ASCVD risk score (Etienne STEEL, et al., 2019) is: 31.1%    Values used to calculate the score:      Age: 75 years      Sex: Male      Is Non- : No      Diabetic: No      Tobacco smoker: No      Systolic Blood Pressure: 140 mmHg      Is BP treated: Yes      HDL Cholesterol: 56 mg/dL      Total Cholesterol: 234 mg/dL      Vitals:    05/19/25 1019   BP: 118/64   Pulse: 61   Resp: 16   Temp: 97.6 °F (36.4 °C)   TempSrc: Temporal   SpO2: 97%   Weight: 205 lb (93 kg)   Height: 5' 11\" (1.803 m)     body mass index is 28.59 kg/m².  BP Readings from Last 3 Encounters:   05/19/25 140/68   05/19/25 118/64   04/02/25 114/66     The 10-year ASCVD risk score (Etienne STEEL, et al., 2019) is: 31.1%    Values used to calculate the score:      Age: 75 years      Sex: Male      Is Non- : No      Diabetic: No      Tobacco smoker: No      Systolic Blood Pressure: 140 mmHg      Is BP treated: Yes      HDL Cholesterol: 56 mg/dL      Total Cholesterol: 234 mg/dL  Results  RADIOLOGY  CT scan: Coronary artery calcifications (05/13/2025)       Medications reviewed:  Current Medications[1]      Assessment & Plan    1. Primary hypertension (Primary)  Hypertension managed with metoprolol 25 mg daily. Blood pressure is on the lower side of normal range. He sometimes forgets to take the medication.  - Continue metoprolol 25 mg daily  - Monitor for symptoms of hypotension or bradycardia    2. POLA (obstructive sleep apnea)  Needs to complete sleep study    3. Itch of eye  4. Eye tearing, bilateral  5.  Cataract of both eyes, unspecified cataract type  Chronic vision problems with difficulty seeing indicators while driving, itching, and periodic blurriness persisting for 3-4 months.  -     Refer to Ophthalmology    6. Benign prostatic hyperplasia with lower urinary tract symptoms, symptom details unspecified  -     Urology Referral - [facility]    7. Erectile dysfunction, unspecified erectile dysfunction type  Chronic erectile dysfunction, previously managed with tadalafil 5 mg daily, which was effective. He has been off medication for about a month. Informed that tadalafil should not be taken with nitroglycerin due to risk of severe hypotension.  - Refill tadalafil 5 mg daily for one month  - Refer to urologist for further evaluation and management  -     Tadalafil; Take 1 tablet (5 mg total) by mouth daily as needed for Erectile Dysfunction.  Dispense: 30 tablet; Refill: 1    8. Toenail fungus  Chronic onychomycosis affecting one toenail since school age, with limited effectiveness from tea tree oil extract. Differential diagnosis includes fungal infection.  - Refer to podiatrist for further evaluation and management  -     Podiatry Referral    9. Mitral valve insufficiency, unspecified etiology  10. Nonrheumatic tricuspid valve regurgitation  11. Pulmonary HTN (HCC)  Needs to see cardiologist  -     Cardio Referral - Internal    12. Gastroesophageal reflux disease with esophagitis without hemorrhage  Chronic esophagitis with inflammation of the esophagus, managed with omeprazole 40 mg twice daily. He sometimes forgets the evening dose.  - Continue omeprazole 40 mg twice daily for a few months  - Reassess dosing schedule after a few months  - F/u with general surgery for Transoral Incisionless Fundoplication    13. Lung nodules  F/u with pulm. CT in 1 year. Will need to request records    14. Chronic obstructive pulmonary disease, unspecified COPD type (HCC)  F/u pulm  Needs sleep study       Follow Up: As  needed/if symptoms worsen or Return in about 3 months (around 8/19/2025) for MEd check..     I spent 60 minutes obtaining pertitent medical history, reviewing pertinent imaging/labs and specialists notes, evaluating patient, discussing differential diagnosis' and various treatment options, reinforcing importance of compliance with treatment plan, and completing documentation.     Objective/ Results:   Physical Exam  Vitals reviewed.   Constitutional:       General: He is not in acute distress.     Appearance: Normal appearance. He is not ill-appearing.   HENT:      Head: Normocephalic and atraumatic.   Eyes:      Extraocular Movements: Extraocular movements intact.      Conjunctiva/sclera: Conjunctivae normal.      Pupils: Pupils are equal, round, and reactive to light.   Cardiovascular:      Rate and Rhythm: Normal rate and regular rhythm.      Heart sounds: No murmur heard.     No gallop.   Pulmonary:      Effort: Pulmonary effort is normal. No respiratory distress.      Breath sounds: Normal breath sounds. No stridor. No wheezing, rhonchi or rales.   Musculoskeletal:      Right lower leg: No edema.      Left lower leg: No edema.   Skin:     General: Skin is warm.      Capillary Refill: Capillary refill takes less than 2 seconds.   Neurological:      General: No focal deficit present.      Mental Status: He is alert and oriented to person, place, and time.   Psychiatric:         Mood and Affect: Mood normal.         Behavior: Behavior normal.         Thought Content: Thought content normal.         Judgment: Judgment normal.        Physical Exam       Reviewed:    Patient Active Problem List    Diagnosis    Cataract of both eyes    Erectile dysfunction    Lung nodules    Mixed hyperlipidemia    Nonrheumatic tricuspid valve regurgitation    Subclinical hyperthyroidism    Pulmonary HTN (HCC)    Mitral valve insufficiency    Pulmonary valve insufficiency    POLA (obstructive sleep apnea)    Primary hypertension     Personal history of colon polyps, unspecified    Dysphagia    Gastroesophageal reflux disease    Prediabetes    Chronic obstructive pulmonary disease (HCC)      Allergies[2]   Short Social Hx on File[3]   Review of Systems   Constitutional: Negative.    HENT: Negative.     Eyes:  Positive for discharge, itching and visual disturbance.   Respiratory: Negative.     Cardiovascular: Negative.    Gastrointestinal: Negative.    Endocrine: Negative.    Genitourinary: Negative.    Musculoskeletal: Negative.    Skin:         Toenail fungus   Neurological: Negative.        All other systems negative unless otherwise stated in ROS or HPI above.       Clare Leal MD  Internal Medicine       Call office with any questions or seek emergency care if necessary.   Patient understands and agrees to follow directions and advice.      ----------------------------------------- PATIENT INSTRUCTIONS-----------------------------------------     There are no Patient Instructions on file for this visit.        The 21st Century Cures Act makes medical notes available to patients in the interest of transparency.  However, please be advised that this is a medical document.  It is intended as a peer to peer communication.  It is written in medical language and may contain abbreviations or verbiage that are technical and unfamiliar.  It may appear blunt or direct.  Medical documents are intended to carry relevant information, facts as evident, and the clinical opinion of the practitioner.          [1]   Current Outpatient Medications   Medication Sig Dispense Refill    tadalafil 5 MG Oral Tab Take 1 tablet (5 mg total) by mouth daily as needed for Erectile Dysfunction. 30 tablet 1    OMEPRAZOLE 40 MG Oral Capsule Delayed Release Take 1 capsule (40 mg total) by mouth before breakfast. 90 capsule 0    ipratropium 0.06 % Nasal Solution 1 spray by Nasal route nightly. 1 sprays in each nostril nightly 1 each 0    memantine 10 MG Oral Tab Take 1  tablet (10 mg total) by mouth at bedtime.      atorvastatin 20 MG Oral Tab Take 1 tablet (20 mg total) by mouth nightly. 90 tablet 1    tadalafil 5 MG Oral Tab Take 1 tablet (5 mg total) by mouth daily.      metFORMIN  MG Oral Tablet 24 Hr Take 1 tablet (500 mg total) by mouth daily with breakfast.      Tiotropium Bromide-Olodaterol 2.5-2.5 MCG/ACT Inhalation Aero Soln Inhale 2 puffs into the lungs daily. 3 each 3    metoprolol succinate ER 25 MG Oral Tablet 24 Hr Take 1 tablet (25 mg total) by mouth daily.     [2] No Known Allergies  [3]   Social History  Socioeconomic History    Marital status:    Tobacco Use    Smoking status: Former     Current packs/day: 0.00     Average packs/day: 1 pack/day for 36.0 years (36.0 ttl pk-yrs)     Types: Cigarettes     Start date:      Quit date:      Years since quittin.3     Passive exposure: Past    Smokeless tobacco: Former   Vaping Use    Vaping status: Never Used   Substance and Sexual Activity    Alcohol use: Not Currently    Drug use: Never   Other Topics Concern    Caffeine Concern No    Exercise No    Seat Belt Yes    Special Diet No    Stress Concern No    Weight Concern No     Social Drivers of Health     Food Insecurity: High Risk (2023)    Received from [facility]    Food Insecurity     Have there been times that your food ran out, and you didn't have money to get more?: Yes     Are there times that you worry that this might happen?: No   Transportation Needs: Low Risk  (2023)    Received from [facility]    Transportation Needs     Do you have trouble getting transportation to medical appointments?: No   Housing Stability:   Recent Concern: Housing Stability - High Risk (2023)    Received from [facility]    Housing Stability     Are you concerned about having a safe and reliable place to live?: Yes

## 2025-05-19 NOTE — TELEPHONE ENCOUNTER
Medical Record Release Documents have been Signed, Faxed and Confirmation Received.     Faxed to [facility] in [place] @ [phone]    Awaiting Records.

## 2025-05-28 ENCOUNTER — MED REC SCAN ONLY (OUTPATIENT)
Dept: INTERNAL MEDICINE CLINIC | Facility: CLINIC | Age: 75
End: 2025-05-28

## 2025-06-25 ENCOUNTER — TELEPHONE (OUTPATIENT)
Dept: INTERNAL MEDICINE CLINIC | Facility: CLINIC | Age: 75
End: 2025-06-25

## 2025-06-26 ENCOUNTER — MED REC SCAN ONLY (OUTPATIENT)
Dept: INTERNAL MEDICINE CLINIC | Facility: CLINIC | Age: 75
End: 2025-06-26

## 2025-06-30 ENCOUNTER — TELEPHONE (OUTPATIENT)
Dept: INTERNAL MEDICINE CLINIC | Facility: CLINIC | Age: 75
End: 2025-06-30

## 2025-06-30 DIAGNOSIS — K44.9 HIATAL HERNIA: Primary | ICD-10-CM

## 2025-06-30 NOTE — TELEPHONE ENCOUNTER
Insurance: [employer]  Provider's Name?: Dr Chester Huitron   Provider's Specialty?: General surgeon    Reason for Visit?: Hiatal hernia   Diagnosis?: Large hiatal hernia    Number of Visits Requested?: 1    Last Visit with Specialist?: 6/30/25     Please fax the retro referral to [phone]    Once referral is approved pt can see referral via [facility]

## 2025-07-26 DIAGNOSIS — E78.2 MIXED HYPERLIPIDEMIA: ICD-10-CM

## 2025-07-26 DIAGNOSIS — K21.9 GASTROESOPHAGEAL REFLUX DISEASE, UNSPECIFIED WHETHER ESOPHAGITIS PRESENT: ICD-10-CM

## 2025-07-26 DIAGNOSIS — R13.10 DYSPHAGIA, UNSPECIFIED TYPE: ICD-10-CM

## 2025-07-26 DIAGNOSIS — R73.03 PREDIABETES: ICD-10-CM

## 2025-07-26 DIAGNOSIS — N52.9 ERECTILE DYSFUNCTION, UNSPECIFIED ERECTILE DYSFUNCTION TYPE: ICD-10-CM

## 2025-07-29 RX ORDER — ATORVASTATIN CALCIUM 20 MG/1
20 TABLET, FILM COATED ORAL NIGHTLY
Qty: 90 TABLET | Refills: 0 | Status: SHIPPED | OUTPATIENT
Start: 2025-07-29

## 2025-07-29 RX ORDER — OMEPRAZOLE 40 MG/1
40 CAPSULE, DELAYED RELEASE ORAL
Qty: 90 CAPSULE | Refills: 0 | Status: SHIPPED | OUTPATIENT
Start: 2025-07-29

## 2025-07-30 RX ORDER — TADALAFIL 5 MG/1
5 TABLET ORAL
Qty: 30 TABLET | Refills: 1 | Status: SHIPPED | OUTPATIENT
Start: 2025-07-30

## 2025-08-25 ENCOUNTER — OFFICE VISIT (OUTPATIENT)
Dept: SURGERY | Facility: CLINIC | Age: 75
End: 2025-08-25

## 2025-08-25 DIAGNOSIS — R82.90 URINE FINDING: ICD-10-CM

## 2025-08-25 DIAGNOSIS — N40.1 BPH WITH OBSTRUCTION/LOWER URINARY TRACT SYMPTOMS: ICD-10-CM

## 2025-08-25 DIAGNOSIS — N13.8 BPH WITH OBSTRUCTION/LOWER URINARY TRACT SYMPTOMS: ICD-10-CM

## 2025-08-25 DIAGNOSIS — N52.9 ERECTILE DYSFUNCTION OF ORGANIC ORIGIN: Primary | ICD-10-CM

## 2025-08-25 LAB
APPEARANCE: CLEAR
BILIRUBIN: NEGATIVE
GLUCOSE: NEGATIVE
KETONES (URINE DIPSTICK): NEGATIVE MG/DL
MULTISTIX LOT#: ABNORMAL NUMERIC
NITRITE, URINE: NEGATIVE
OCCULT BLOOD: NEGATIVE
PH, URINE: 5.5 (ref 4.5–8)
PROTEIN (URINE DIPSTICK): NEGATIVE MG/DL
SPECIFIC GRAVITY: 1.01 (ref 1–1.03)
URINE-COLOR: YELLOW
UROBILINOGEN,SEMI-QN: 0.2 MG/DL (ref 0–1.9)

## 2025-08-25 PROCEDURE — 99204 OFFICE O/P NEW MOD 45 MIN: CPT | Performed by: SURGERY

## 2025-08-25 PROCEDURE — 81003 URINALYSIS AUTO W/O SCOPE: CPT | Performed by: SURGERY

## 2025-08-25 PROCEDURE — G2211 COMPLEX E/M VISIT ADD ON: HCPCS | Performed by: SURGERY

## 2025-08-25 RX ORDER — SILDENAFIL 100 MG/1
100 TABLET, FILM COATED ORAL
Qty: 30 TABLET | Refills: 5 | Status: SHIPPED | OUTPATIENT
Start: 2025-08-25

## 2025-08-25 RX ORDER — TAMSULOSIN HYDROCHLORIDE 0.4 MG/1
0.4 CAPSULE ORAL EVERY EVENING
Qty: 90 CAPSULE | Refills: 6 | Status: SHIPPED | OUTPATIENT
Start: 2025-08-25

## 2025-08-26 ENCOUNTER — TELEPHONE (OUTPATIENT)
Dept: SURGERY | Facility: CLINIC | Age: 75
End: 2025-08-26